# Patient Record
Sex: FEMALE | Race: WHITE | NOT HISPANIC OR LATINO | Employment: OTHER | URBAN - METROPOLITAN AREA
[De-identification: names, ages, dates, MRNs, and addresses within clinical notes are randomized per-mention and may not be internally consistent; named-entity substitution may affect disease eponyms.]

---

## 2018-02-06 ENCOUNTER — OFFICE VISIT CONVERTED (OUTPATIENT)
Dept: NEUROSURGERY | Facility: CLINIC | Age: 73
End: 2018-02-06
Attending: NEUROLOGICAL SURGERY

## 2018-02-27 ENCOUNTER — CONVERSION ENCOUNTER (OUTPATIENT)
Dept: CARDIOLOGY | Facility: CLINIC | Age: 73
End: 2018-02-27
Attending: SPECIALIST

## 2018-03-20 ENCOUNTER — OFFICE VISIT CONVERTED (OUTPATIENT)
Dept: NEUROSURGERY | Facility: CLINIC | Age: 73
End: 2018-03-20
Attending: NEUROLOGICAL SURGERY

## 2018-06-21 ENCOUNTER — OFFICE VISIT CONVERTED (OUTPATIENT)
Dept: NEUROSURGERY | Facility: CLINIC | Age: 73
End: 2018-06-21
Attending: NEUROLOGICAL SURGERY

## 2018-07-03 ENCOUNTER — OFFICE VISIT CONVERTED (OUTPATIENT)
Dept: CARDIOLOGY | Facility: CLINIC | Age: 73
End: 2018-07-03
Attending: SPECIALIST

## 2018-07-03 ENCOUNTER — CONVERSION ENCOUNTER (OUTPATIENT)
Dept: CARDIOLOGY | Facility: CLINIC | Age: 73
End: 2018-07-03

## 2018-11-06 ENCOUNTER — CONVERSION ENCOUNTER (OUTPATIENT)
Dept: CARDIOLOGY | Facility: CLINIC | Age: 73
End: 2018-11-06
Attending: SPECIALIST

## 2018-11-06 ENCOUNTER — CONVERSION ENCOUNTER (OUTPATIENT)
Dept: OTHER | Facility: HOSPITAL | Age: 73
End: 2018-11-06

## 2019-03-05 ENCOUNTER — OFFICE VISIT CONVERTED (OUTPATIENT)
Dept: CARDIOLOGY | Facility: CLINIC | Age: 74
End: 2019-03-05
Attending: SPECIALIST

## 2019-03-14 ENCOUNTER — HOSPITAL ENCOUNTER (OUTPATIENT)
Dept: GENERAL RADIOLOGY | Facility: HOSPITAL | Age: 74
Discharge: HOME | End: 2019-03-14
Attending: FAMILY MEDICINE

## 2019-06-10 ENCOUNTER — OFFICE VISIT CONVERTED (OUTPATIENT)
Dept: UROLOGY | Facility: CLINIC | Age: 74
End: 2019-06-10
Attending: UROLOGY

## 2019-06-10 ENCOUNTER — HOSPITAL ENCOUNTER (OUTPATIENT)
Dept: GENERAL RADIOLOGY | Facility: HOSPITAL | Age: 74
Discharge: HOME | End: 2019-06-10
Attending: UROLOGY

## 2019-06-10 ENCOUNTER — CONVERSION ENCOUNTER (OUTPATIENT)
Dept: SURGERY | Facility: CLINIC | Age: 74
End: 2019-06-10

## 2019-06-25 ENCOUNTER — HOSPITAL ENCOUNTER (OUTPATIENT)
Dept: GENERAL RADIOLOGY | Facility: HOSPITAL | Age: 74
Discharge: HOME | End: 2019-06-25
Attending: FAMILY MEDICINE

## 2019-06-25 ENCOUNTER — OFFICE VISIT CONVERTED (OUTPATIENT)
Dept: CARDIOLOGY | Facility: CLINIC | Age: 74
End: 2019-06-25
Attending: SPECIALIST

## 2019-06-25 LAB
CREAT BLD-MCNC: 1 MG/DL (ref 0.6–1.4)
GFR SERPLBLD BASED ON 1.73 SQ M-ARVRAT: 55 ML/MIN/{1.73_M2}

## 2019-09-10 ENCOUNTER — OFFICE VISIT CONVERTED (OUTPATIENT)
Dept: CARDIOLOGY | Facility: CLINIC | Age: 74
End: 2019-09-10
Attending: SPECIALIST

## 2020-02-28 ENCOUNTER — OFFICE VISIT CONVERTED (OUTPATIENT)
Dept: CARDIOLOGY | Facility: CLINIC | Age: 75
End: 2020-02-28
Attending: SPECIALIST

## 2020-02-28 ENCOUNTER — CONVERSION ENCOUNTER (OUTPATIENT)
Dept: CARDIOLOGY | Facility: CLINIC | Age: 75
End: 2020-02-28

## 2020-03-17 ENCOUNTER — OFFICE VISIT CONVERTED (OUTPATIENT)
Dept: NEUROSURGERY | Facility: CLINIC | Age: 75
End: 2020-03-17
Attending: NEUROLOGICAL SURGERY

## 2020-03-19 ENCOUNTER — HOSPITAL ENCOUNTER (OUTPATIENT)
Dept: GENERAL RADIOLOGY | Facility: HOSPITAL | Age: 75
Discharge: HOME | End: 2020-03-19
Attending: NEUROLOGICAL SURGERY

## 2020-03-26 ENCOUNTER — TELEMEDICINE CONVERTED (OUTPATIENT)
Dept: NEUROSURGERY | Facility: CLINIC | Age: 75
End: 2020-03-26
Attending: NEUROLOGICAL SURGERY

## 2020-07-08 ENCOUNTER — HOSPITAL ENCOUNTER (OUTPATIENT)
Dept: WOUND CARE | Facility: HOSPITAL | Age: 75
Setting detail: RECURRING SERIES
Discharge: HOME | End: 2020-10-06
Attending: FAMILY MEDICINE

## 2020-09-18 ENCOUNTER — OFFICE VISIT CONVERTED (OUTPATIENT)
Dept: CARDIOLOGY | Facility: CLINIC | Age: 75
End: 2020-09-18
Attending: SPECIALIST

## 2020-09-18 ENCOUNTER — CONVERSION ENCOUNTER (OUTPATIENT)
Dept: CARDIOLOGY | Facility: CLINIC | Age: 75
End: 2020-09-18

## 2021-01-01 ENCOUNTER — OFFICE VISIT (OUTPATIENT)
Dept: ORTHOPEDIC SURGERY | Facility: CLINIC | Age: 76
End: 2021-01-01

## 2021-01-01 ENCOUNTER — HOSPITAL ENCOUNTER (OUTPATIENT)
Dept: CT IMAGING | Facility: HOSPITAL | Age: 76
Discharge: HOME | End: 2021-10-01
Admitting: FAMILY MEDICINE

## 2021-01-01 ENCOUNTER — TRANSCRIBE ORDERS (OUTPATIENT)
Dept: ADMINISTRATIVE | Facility: HOSPITAL | Age: 76
End: 2021-01-01

## 2021-01-01 ENCOUNTER — OFFICE VISIT (OUTPATIENT)
Dept: CARDIOLOGY | Facility: CLINIC | Age: 76
End: 2021-01-01

## 2021-01-01 VITALS
HEIGHT: 59 IN | BODY MASS INDEX: 34.07 KG/M2 | WEIGHT: 169 LBS | DIASTOLIC BLOOD PRESSURE: 86 MMHG | SYSTOLIC BLOOD PRESSURE: 146 MMHG | HEART RATE: 104 BPM

## 2021-01-01 VITALS — OXYGEN SATURATION: 92 % | HEART RATE: 84 BPM | HEIGHT: 59 IN | BODY MASS INDEX: 31.93 KG/M2 | WEIGHT: 158.4 LBS

## 2021-01-01 DIAGNOSIS — R10.84 GENERALIZED ABDOMINAL PAIN: ICD-10-CM

## 2021-01-01 DIAGNOSIS — M25.572 LEFT ANKLE PAIN, UNSPECIFIED CHRONICITY: ICD-10-CM

## 2021-01-01 DIAGNOSIS — I25.10 CORONARY ARTERY DISEASE INVOLVING NATIVE CORONARY ARTERY OF NATIVE HEART WITHOUT ANGINA PECTORIS: Primary | ICD-10-CM

## 2021-01-01 DIAGNOSIS — I50.32 DIASTOLIC CHF, CHRONIC (HCC): ICD-10-CM

## 2021-01-01 DIAGNOSIS — S82.892D CLOSED FRACTURE OF LEFT ANKLE WITH ROUTINE HEALING, SUBSEQUENT ENCOUNTER: Primary | ICD-10-CM

## 2021-01-01 DIAGNOSIS — I10 HYPERTENSION, ESSENTIAL: ICD-10-CM

## 2021-01-01 DIAGNOSIS — R10.84 GENERALIZED ABDOMINAL PAIN: Primary | ICD-10-CM

## 2021-01-01 DIAGNOSIS — Z95.1 HX OF CABG: ICD-10-CM

## 2021-01-01 LAB — CREAT BLDA-MCNC: 0.8 MG/DL

## 2021-01-01 PROCEDURE — 99214 OFFICE O/P EST MOD 30 MIN: CPT | Performed by: SPECIALIST

## 2021-01-01 PROCEDURE — 74177 CT ABD & PELVIS W/CONTRAST: CPT

## 2021-01-01 PROCEDURE — 82565 ASSAY OF CREATININE: CPT

## 2021-01-01 PROCEDURE — 99212 OFFICE O/P EST SF 10 MIN: CPT | Performed by: STUDENT IN AN ORGANIZED HEALTH CARE EDUCATION/TRAINING PROGRAM

## 2021-01-01 PROCEDURE — 0 IOPAMIDOL PER 1 ML: Performed by: FAMILY MEDICINE

## 2021-01-01 PROCEDURE — 93000 ELECTROCARDIOGRAM COMPLETE: CPT | Performed by: SPECIALIST

## 2021-01-01 RX ORDER — FUROSEMIDE 20 MG/1
TABLET ORAL
Qty: 45 TABLET | Refills: 2 | Status: SHIPPED | OUTPATIENT
Start: 2021-01-01

## 2021-01-01 RX ORDER — LISINOPRIL 10 MG/1
10 TABLET ORAL DAILY
Qty: 90 TABLET | Refills: 3 | Status: SHIPPED | OUTPATIENT
Start: 2021-01-01 | End: 2021-01-01 | Stop reason: SDUPTHER

## 2021-01-01 RX ORDER — LISINOPRIL 20 MG/1
20 TABLET ORAL DAILY
Qty: 90 TABLET | Refills: 3 | Status: SHIPPED | OUTPATIENT
Start: 2021-01-01

## 2021-01-01 RX ORDER — LISINOPRIL 10 MG/1
TABLET ORAL
Qty: 30 TABLET | Refills: 2 | Status: SHIPPED | OUTPATIENT
Start: 2021-01-01 | End: 2021-01-01 | Stop reason: SDUPTHER

## 2021-01-01 RX ORDER — CLOPIDOGREL BISULFATE 75 MG/1
75 TABLET ORAL DAILY
Qty: 90 TABLET | Refills: 3 | Status: SHIPPED | OUTPATIENT
Start: 2021-01-01

## 2021-01-01 RX ORDER — CARVEDILOL 12.5 MG/1
TABLET ORAL
Qty: 180 TABLET | Refills: 3 | Status: SHIPPED | OUTPATIENT
Start: 2021-01-01

## 2021-01-01 RX ORDER — CLOPIDOGREL BISULFATE 75 MG/1
TABLET ORAL
Qty: 30 TABLET | Refills: 2 | Status: SHIPPED | OUTPATIENT
Start: 2021-01-01 | End: 2021-01-01 | Stop reason: SDUPTHER

## 2021-01-01 RX ADMIN — IOPAMIDOL 100 ML: 755 INJECTION, SOLUTION INTRAVENOUS at 13:07

## 2021-02-24 ENCOUNTER — OFFICE VISIT CONVERTED (OUTPATIENT)
Dept: ORTHOPEDIC SURGERY | Facility: CLINIC | Age: 76
End: 2021-02-24
Attending: STUDENT IN AN ORGANIZED HEALTH CARE EDUCATION/TRAINING PROGRAM

## 2021-03-03 ENCOUNTER — OFFICE VISIT CONVERTED (OUTPATIENT)
Dept: ORTHOPEDIC SURGERY | Facility: CLINIC | Age: 76
End: 2021-03-03
Attending: STUDENT IN AN ORGANIZED HEALTH CARE EDUCATION/TRAINING PROGRAM

## 2021-03-15 ENCOUNTER — OFFICE VISIT CONVERTED (OUTPATIENT)
Dept: ORTHOPEDIC SURGERY | Facility: CLINIC | Age: 76
End: 2021-03-15
Attending: STUDENT IN AN ORGANIZED HEALTH CARE EDUCATION/TRAINING PROGRAM

## 2021-03-29 ENCOUNTER — OFFICE VISIT CONVERTED (OUTPATIENT)
Dept: ORTHOPEDIC SURGERY | Facility: CLINIC | Age: 76
End: 2021-03-29
Attending: STUDENT IN AN ORGANIZED HEALTH CARE EDUCATION/TRAINING PROGRAM

## 2021-04-12 ENCOUNTER — OFFICE VISIT CONVERTED (OUTPATIENT)
Dept: ORTHOPEDIC SURGERY | Facility: CLINIC | Age: 76
End: 2021-04-12
Attending: STUDENT IN AN ORGANIZED HEALTH CARE EDUCATION/TRAINING PROGRAM

## 2021-05-10 NOTE — H&P
History and Physical      Patient Name: Rochelle Serrano   Patient ID: [id number]   Sex: Female   YOB: 1945    Primary Care Provider: Latrell Miguel MD   Referring Provider: Latosha Smith Jr. MD    Visit Date: February 24, 2021    Provider: Blake Acuña MD   Location: [facility]   Location Address: [address] [place] [address]  [id number]   Location Phone: [phone]          Chief Complaint  · left ankle pain      History Of Present Illness  Rochelle Serrano is a 75 year old /White female who presents today to [facility].      Rochelle presents to the office for evaluation of her left ankle. She slipped on ice and fell on the date of 2/22/2021. She felt immediate pain and was unable to ambulate on her left lower extremity. She presented to [facility] ED where x-rays were obtained and revealed a nondisplaced oblique fracture of the distal fibular metaphysis. She was placed in a fracture boot and follows up today for further evaluation. She reports she sustained some bumps and bruising, however denies any other associated injuries. She has history of 3 heart attacks and 4 stents. She is a diabetic, not on insulin. She is on Plavix. She states she had a previous blood clot 4-5 years ago. She states that she had fractured her boot previously twice as well as a shoulder surgery with Dr. Flores in 2012. She mentions numbness of her great toe. She denies any other numbness or tingling.  She is a nonsmoker. She is retired.               Past Medical History  Arm paresthesia, left; Arthritis; Asthma; CAD (coronary artery disease); Carpal tunnel syndrome; Carpal tunnel syndrome of left wrist; Cataract; Cervical radiculopathy; Chest pain; Congestive heart failure; Diabetes; DJD (degenerative joint disease); DVT (deep venous thrombosis); Heart Attack; Hyperlipemia; Hypertension; Kidney stone; Left leg pain; Limb Swelling; Lumbago; MI (myocardial infarction);  "Osteoarthritis; Paresthesia; Peptic ulcer; Sciatic leg pain; Shortness of Breath         Past Surgical History  Appendectomy; Carpal tunnel release; Cataract surgery; Cervical Fusion; Colonoscopy with polypectomy and biopsy; Hysterectomy; Rotator Cuff repair; Thyroidectomy; Ureter stent placement; Ureteroscopic Stone Removal         Medication List  albuterol sulfate 2.5 mg/0.5 mL inhalation solution for nebulization; Coreg 12.5 mg oral tablet; furosemide 20 mg oral tablet; Plavix 75 mg oral tablet; potassium chloride 20 mEq oral tablet extended release; Qvar 40 mcg/actuation inhalation aerosol; tramadol 50 mg oral tablet; Ventolin HFA 90 mcg/actuation inhalation HFA aerosol inhaler; Zestril 5 mg oral tablet         Allergy List  Acetaminophen adverse reaction; All Mycins; Codiene; erythromycin; PENICILLINS; SULFA (SULFONAMIDES); Tylenol       Allergies Reconciled  Family Medical History  Heart Disease; Cancer, Unspecified; Heart Attack (MI); Diabetes         Social History  Alcohol Use (Never); Claustophobic (Unknown); lives with spouse; .; Recreational Drug Use (Never); Retired.; Tobacco (Never)         Review of Systems  · Constitutional  o Denies  o : fever, chills, weight loss  · Cardiovascular  o Denies  o : chest pain, shortness of breath  · Gastrointestinal  o Denies  o : liver disease, heartburn, nausea, blood in stools  · Genitourinary  o Denies  o : painful urination, blood in urine  · Integument  o Denies  o : rash, itching  · Neurologic  o Denies  o : headache, weakness, loss of consciousness  · Musculoskeletal  o Admits  o : left ankle pain   · Psychiatric  o Denies  o : drug/alcohol addiction, anxiety, depression      Vitals  Date Time BP Position Site L\R Cuff Size HR RR TEMP (F) WT  HT  BMI kg/m2 BSA m2 O2 Sat FR L/min FiO2        02/24/2021 01:36 PM      94 - R   146lbs 0oz 4'  11\" 29.49 1.66 97 %            Physical Examination  · Constitutional  o Appearance  o : well developed, " well-nourished, no obvious deformities present  · Head and Face  o Head  o :   § Inspection  § : normocephalic  o Face  o :   § Inspection  § : no facial lesions  · Eyes  o Conjunctivae  o : conjunctivae normal  o Sclerae  o : sclerae white  · Ears, Nose, Mouth and Throat  o Ears  o :   § External Ears  § : appearance within normal limits  § Hearing  § : intact  o Nose  o :   § External Nose  § : appearance normal  · Neck  o Inspection/Palpation  o : normal appearance  o Range of Motion  o : full range of motion  · Respiratory  o Respiratory Effort  o : breathing unlabored  o Inspection of Chest  o : normal appearance  o Auscultation of Lungs  o : no audible wheezing or rales  · Cardiovascular  o Heart  o : regular rate  · Gastrointestinal  o Abdominal Examination  o : soft and non-tender  · Skin and Subcutaneous Tissue  o General Inspection  o : intact, no rashes  · Psychiatric  o General  o : Alert and oriented x3  o Judgement and Insight  o : judgment and insight intact  o Mood and Affect  o : mood normal, affect appropriate  · Left Ankle/Foot  o Inspection  o : Fracture boot removed. Moderate diffused swelling and bruising over the distal lower leg extending down the ankle. Tenderness over the medial malleolus. Tenderness over the lateral malleolus. Tenderness over the distal tibia shaft. No pain with syndesmotic squeeze. Nontender over the fibular head at the knee. Calf soft. Demonstrates active ankle dorsiflexion and plantarflexion. Sensations to light touch intact over the dorsal and planter foot. Palpable DP pulse.   · In Office Procedures  o View  o : stress/ AP, lateral   o Site  o : left ankle , tib/fib  o Indication  o : left ankle pain   o Study  o : X-rays ordered, taken in the office, and reviewed today.  o Xray  o : AP, oblique, and stress views of the left ankle obtained in the office today have been reviewed. A lateral malleolus fracture is visualized. This is oblique and at the level of the  syndesmosis. With stress there is interval increase in medial clear space and a likely medial malleolus avulsion fracture identified. No other acute fractures noted.  · Casting  o Extremity  o : left leg, Short leg cast.    o Procedure  o : Closed treatment was obtained and fiberglass cast was applied. The patient tolerated the procedure without any complications          Assessment  · Ankle fracture     824.8/S82.899A  A nondisplaced oblique fracture of the distal fibular metaphysis. Initial injury occurred 2/22/2021.  · Left ankle pain     719.47/M25.572      Plan  · Orders  o Casting Supplies (Short Leg) Adult () - 719.47/M25.572 - 02/24/2021   Applied by Moni Lagos MA  o Application of short leg cast (06939) - 719.47/M25.572 - 02/24/2021   Applied by Moni Lagos MA  o Ankle (Left) [facility] Preferred View ([id number]) - 719.47/M25.572 - 02/24/2021  o Tib/Fib (Left) [facility] Preferred View ([id number]) - 719.47/M25.572 - 02/24/2021  · Medications  o Medications have been Reconciled  o Transition of Care or Provider Policy  · Instructions  o X-rays reviewed by Dr. Acuña.  o Reviewed the patient's Past Medical, Social, and Family history as well as the ROS at today's visit, no changes.  o Call or return if worsening symptoms.  o Follow up in 1 week.  o The above service was scribed by Andre Guevara on my behalf and I attest to the accuracy of the note. cb  o Nafisa has a left ankle fracture. This appears to be a bimalleolar fracture with a lateral malleolus fracture at the level of the syndesmosis and a medial malleolus avulsion fracture. There is some instability noted on stress view today. Static view show a well aligned mortise. Nafisa elected for nonoperative management. She would like to avoid any surgeries. We will place her in a well-padded short leg cast. She should remain nonweightbearing. I will see her back in 1 week with new AP, oblique, and lateral x-rays of the left ankle and her cast. She will  continue on Plavix for her cardiac history. A prescription for Ultram was provided today for pain. Cast care was discussed.            Electronically Signed by: Blake Acuña MD -Author on February 28, 2021 01:35:08 PM

## 2021-05-12 ENCOUNTER — OFFICE VISIT CONVERTED (OUTPATIENT)
Dept: ORTHOPEDIC SURGERY | Facility: CLINIC | Age: 76
End: 2021-05-12
Attending: STUDENT IN AN ORGANIZED HEALTH CARE EDUCATION/TRAINING PROGRAM

## 2021-05-13 NOTE — PROGRESS NOTES
"   Progress Note      Patient Name: Rochelle Serrano   Patient ID: [id number]   Sex: Female   YOB: 1945    Primary Care Provider: Latrell Miguel MD   Referring Provider: Latosha Smith Jr. MD    Visit Date: September 18, 2020    Provider: Robin Bauer MD   Location: [facility]   Location Address: [address] [place] [address], [address]   [place]  [id number]   Location Phone: [phone]          Chief Complaint  · Coronary artery disease   · Pericardial effusion       History Of Present Illness  Rochelle Serrano is a 75 year old /White female with COPD, coronary artery disease, s/p CABG surgery, pericardial effusion, hypertension. Shortness of breath is stable. She is still refusing pericardial window for her posterior pericardial effusion.   CURRENT MEDICATIONS: include Zestril 10 mg daily; Plavix 75 mg daily; Lasix 20 mg 1-1/2 tablets daily; Coreg 12.5 mg b.i.d; Potassium 20 mEq daily. The dosage and frequency of the medications were reviewed with the patient.   PAST MEDICAL HISTORY: Chronic diastolic heart failure; coronary artery disease, s/p coronary artery bypass graft; essential hypertension; pericardial effusion.   FAMILY HISTORY: Positive for hypertension and heart disease. Negative for diabetes.   PSYCHOSOCIAL HISTORY: No history of mood changes or depression. She never used alcohol.       Review of Systems  · Cardiovascular  o Admits  o : swelling (feet, ankles, hands), shortness of breath while walking or lying flat  o Denies  o : palpitations (fast, fluttering, or skipping beats), chest pain or angina pectoris   · Respiratory  o Admits  o : chronic or frequent cough, asthma or wheezing      Vitals  Date Time BP Position Site L\R Cuff Size HR RR TEMP (F) WT  HT  BMI kg/m2 BSA m2 O2 Sat        09/18/2020 10:11 /74 Sitting    88 - R   156lbs 16oz 4'  11\" 31.71 1.72           Physical Examination  · Constitutional  o Appearance  o : Awake, alert, cooperative, " pleasant.  · Respiratory  o Inspection of Chest  o : No chest wall deformities, moving equal.  o Auscultation of Lungs  o : Good air entry with vesicular breath sounds.  · Cardiovascular  o Heart  o :   § Auscultation of Heart  § : S1 and S2 regular. No S3. No S4. No murmurs.  o Peripheral Vascular System  o :   § Extremities  § : Peripheral pulses were well felt. No edema. No cyanosis.  · Gastrointestinal  o Abdominal Examination  o : No masses or organomegaly noted.          Assessment     ASSESSMENT AND PLAN:    1.  Coronary artery disease, s/p CABG surgery, stable:  Continue current dose of Plavix.  2.  Chronic diastolic heart failure, stable:  Continue Lasix.  3.  Essential hypertension controlled:  Continue current dose of Zestril.  4.  Hyperlipidemia:  Not willing to start Lipitor since she had muscle pains with it.  5.  Moderately severe pericardial effusion, asymptomatic:  I discussed with her about pericardial window again.       She is still refusing.  Will think about it.  6.  See me back in 6 months.    Robin Bauer MD, [facility]  EMMANUEL/indu           This note was transcribed by Chelsea Mckeon.  indu/EMMANUEL  The above service was transcribed by Chelsea Mckeon, and I attest to the accuracy of the note.  EMMANUEL               Electronically Signed by: Chelsea Mckeon-, -Author on September 23, 2020 09:28:56 AM  Electronically Co-signed by: oRbin Bauer MD -Reviewer on September 23, 2020 10:32:01 AM

## 2021-05-14 VITALS — WEIGHT: 146 LBS | OXYGEN SATURATION: 97 % | HEART RATE: 94 BPM | HEIGHT: 59 IN | BODY MASS INDEX: 29.43 KG/M2

## 2021-05-14 VITALS
WEIGHT: 157 LBS | SYSTOLIC BLOOD PRESSURE: 120 MMHG | HEIGHT: 59 IN | DIASTOLIC BLOOD PRESSURE: 74 MMHG | BODY MASS INDEX: 31.65 KG/M2 | HEART RATE: 88 BPM

## 2021-05-14 VITALS — HEART RATE: 72 BPM | HEIGHT: 59 IN | TEMPERATURE: 96.6 F | OXYGEN SATURATION: 94 %

## 2021-05-14 VITALS — HEART RATE: 101 BPM | HEIGHT: 59 IN | OXYGEN SATURATION: 96 %

## 2021-05-14 VITALS — WEIGHT: 154.31 LBS | HEIGHT: 59 IN | HEART RATE: 90 BPM | OXYGEN SATURATION: 95 % | BODY MASS INDEX: 31.11 KG/M2

## 2021-05-14 VITALS — OXYGEN SATURATION: 96 % | HEART RATE: 54 BPM | TEMPERATURE: 97 F | HEIGHT: 59 IN

## 2021-05-14 NOTE — PROGRESS NOTES
Progress Note      Patient Name: Rochelle Serrano   Patient ID: [id number]   Sex: Female   YOB: 1945    Primary Care Provider: Latrell Miguel MD   Referring Provider: Latosha Smith Jr. MD    Visit Date: March 29, 2021    Provider: Blake Acuña MD   Location: [facility]   Location Address: [address] [place] [address]  [id number]   Location Phone: [phone]          Chief Complaint  · Followup left ankle fracture.      History Of Present Illness  Rochelle Serrano is a 76 year old /White female who returns for followup of her left ankle. She has a bimalleolar fracture with a lateral malleolus fracture at the level of the syndesmosis and a medial malleolus avulsion fracture. Her injury was on 02/22/2021. She is 6 weeks out from her injury now. She was treated in a short-leg cast for 4 weeks, followed by the last 2 weeks in a fracture boot. She has remained non-weightbearing. She denies any new injury since her previous visit. She reports that the boot is much more comfortable than the cast. She feels that her pain is improving overall. She is anxious to begin weightbearing.       Past Medical History  Arm paresthesia, left; Arthritis; Asthma; CAD (coronary artery disease); Carpal tunnel syndrome; Carpal tunnel syndrome of left wrist; Cataract; Cervical radiculopathy; Chest pain; Congestive heart failure; Diabetes; DJD (degenerative joint disease); DVT (deep venous thrombosis); Heart Attack; Hyperlipemia; Hypertension; Kidney Stone; Left leg pain; Limb Swelling; Lumbago; MI (myocardial infarction); Osteoarthritis; Paresthesia; Peptic ulcer; Sciatic leg pain; Shortness of Breath         Past Surgical History  Appendectomy; Carpal tunnel release; Cataract surgery; Cervical Fusion; Colonoscopy with polypectomy and biopsy; Hysterectomy; Rotator Cuff repair; Thyroidectomy; Ureter stent placement; Ureteroscopic Stone Removal         Medication List  albuterol sulfate 2.5 mg/0.5  "mL inhalation solution for nebulization; Coreg 12.5 mg oral tablet; furosemide 20 mg oral tablet; Plavix 75 mg oral tablet; potassium chloride 20 mEq oral tablet extended release; Qvar 40 mcg/actuation inhalation aerosol; tramadol 50 mg oral tablet; Ventolin HFA 90 mcg/actuation inhalation HFA aerosol inhaler; Zestril 5 mg oral tablet         Allergy List  Acetaminophen adverse reaction; All Mycins; Codiene; erythromycin; flu vaccine ts 2011-12(18 yr+); PENICILLINS; SULFA (SULFONAMIDES); Tylenol       Allergies Reconciled  Family Medical History  Heart Disease; Cancer, Unspecified; Heart Attack (MI); Diabetes         Social History  Alcohol Use (Never); Claustophobic (Unknown); lives with spouse; .; Recreational Drug Use (Never); Retired.; Tobacco (Never)         Review of Systems  · Constitutional  o Denies  o : fever, chills, weight loss  · Cardiovascular  o Denies  o : chest pain, shortness of breath  · Gastrointestinal  o Denies  o : liver disease, heartburn, nausea, blood in stools  · Genitourinary  o Denies  o : painful urination, blood in urine  · Integument  o Denies  o : rash, itching  · Neurologic  o Denies  o : headache, weakness, loss of consciousness  · Musculoskeletal  o Admits  o : painful, swollen joints  · Psychiatric  o Denies  o : drug/alcohol addiction, anxiety, depression      Vitals  Date Time BP Position Site L\R Cuff Size HR RR TEMP (F) WT  HT  BMI kg/m2 BSA m2 O2 Sat FR L/min FiO2        03/29/2021 09:07 AM      72 - R  96.6  4'  11\"   94 %  21%          Physical Examination  · Constitutional  o Appearance  o : well developed, well-nourished, no obvious deformities present  · Head and Face  o Head  o :   § Inspection  § : normocephalic  o Face  o :   § Inspection  § : no facial lesions  · Eyes  o Conjunctivae  o : conjunctivae normal  o Sclerae  o : sclerae white  · Ears, Nose, Mouth and Throat  o Ears  o :   § External Ears  § : appearance within normal limits  § Hearing  § : " intact  o Nose  o :   § External Nose  § : appearance normal  · Neck  o Inspection/Palpation  o : normal appearance  o Range of Motion  o : full range of motion  · Respiratory  o Respiratory Effort  o : breathing unlabored  o Inspection of Chest  o : normal appearance  o Auscultation of Lungs  o : no audible wheezing or rales  · Cardiovascular  o Heart  o : regular rate  · Gastrointestinal  o Abdominal Examination  o : soft and non-tender  · Skin and Subcutaneous Tissue  o General Inspection  o : intact, no rashes  · Psychiatric  o General  o : Alert and oriented x3  o Judgement and Insight  o : judgment and insight intact  o Mood and Affect  o : mood normal, affect appropriate  · Left Ankle/Foot  o Inspection  o : Fracture boot removed. Diffuse dry skin again noted. Mild persistent swelling. Tenderness over the lateral malleolus. Nontender over the medial malleolus. No pain with syndesmotic squeeze. Nontender about the hindfoot, midfoot, forefoot. Distal tenderness over the Achilles tendon, which is intact. Calf soft and nontender. No palpable cords are felt proximally. Sensation to light touch over the dorsal and plantar foot. Demonstrates active ankle plantar flexion and dorsiflexion, limited by stiffness. Palpable dorsalis pedis pulse.  · In Office Procedures  o View  o : AP/LATERAL/OBLIQUE  o Site  o : Left ankle  o Indication  o : Left ankle fracture  o Study  o : X-rays ordered, taken in the office, and reviewed today.  o Xray  o : The lateral malleolus fracture at the level of the syndesmosis is again seen. There are signs of bony healing with callus forming about the medial and lateral aspects of the fracture site. The medial malleolar avulsion-type fracture fragment is again seen and is unchanged in its appearance. The talus remains well reduced beneath the tibia. The syndesmosis is well aligned overall.   o Comparative Data  o : Comparative data found and reviewed today. For comparative data, this is  stable fracture alignment with signs of bone healing.           Assessment  · Left ankle fracture     824.8/S82.899A  · Left ankle pain, unspecified chronicity     719.47/M25.572      Plan  · Orders  o Ankle (Left) [facility] Preferred View ([id number]) - 719.47/M25.572 - 03/29/2021  · Medications  o Medications have been Reconciled  o Transition of Care or Provider Policy  · Instructions  o Reviewed the patient's Past Medical, Social, and Family history as well as the ROS at today's visit, no changes.  o Call or return if worsening symptoms.  o Note transcribed by Edilia Loaiza. cb   o Rochelle is now approximately 6 weeks out from her injury with a left bimalleolar ankle fracture that we are treating nonoperatively. Her ankle remains stable on X-rays obtained today, and she does have some signs of bone healing. We will progress her rehab at this time. She may begin to weight bear as tolerated in her fracture boot. She will start formal physical therapy to assist with her transition to weight bearing, as well as focus on range of motion and strengthening exercises. I will see her back in 2 weeks for reevaluation. We will obtain new AP, lateral, oblique views of the ankle at that time. She does have a walker that she will use as she starts physical therapy.             Electronically Signed by: Blake Acuña MD -Author on April 4, 2021 11:31:39 AM

## 2021-05-14 NOTE — PROGRESS NOTES
Progress Note      Patient Name: Rochelle Serrano   Patient ID: [id number]   Sex: Female   YOB: 1945    Primary Care Provider: Latrell Miguel MD   Referring Provider: Latosha Smith Jr. MD    Visit Date: March 3, 2021    Provider: Blake Acuña MD   Location: [facility]   Location Address: [address] [place] [address]  [id number]   Location Phone: [phone]          Chief Complaint  · Left ankle pain       History Of Present Illness  Rochelle Serrano is a 75 year old /White female who presents today to [facility].      Rochelle presents to the office for a follow up of her left ankle. To review, she has a bimalleolar fracture with a lateral malleolus fracture at the level of the syndesmosis and a medial malleolus avulsion fracture. She slipped on ice and fell on the date of 2/22/2021. She was placed in a fracture boot prior to her initial visit. We have been treating her in a well padded short leg cast for 2 weeks thus far. The cast is well-fitting. She is tolerating the cast well. She has intermittent sharp/radiating pains in the leg at times. She reports that she is experiencing a lot of pain over the fracture site. She denies any new falls or injuries.  She has been taking Ultram, tolerating it well. She is allergic to Tylenol. She has history of 3 heart attacks and 4 stents. She is a diabetic, not on insulin. She denies neuropathy. She is on Plavix. She states she had a previous blood clot 4-5 years ago. She mentions numbness of her great toe. She denies any other numbness or tingling.  She is a nonsmoker. She is retired.       Past Medical History  Arm paresthesia, left; Arthritis; Asthma; CAD (coronary artery disease); Carpal tunnel syndrome; Carpal tunnel syndrome of left wrist; Cataract; Cervical radiculopathy; Chest pain; Congestive heart failure; Diabetes; DJD (degenerative joint disease); DVT (deep venous thrombosis); Heart Attack; Hyperlipemia;  "Hypertension; Kidney stone; Left leg pain; Limb Swelling; Lumbago; MI (myocardial infarction); Osteoarthritis; Paresthesia; Peptic ulcer; Sciatic leg pain; Shortness of Breath         Past Surgical History  Appendectomy; Carpal tunnel release; Cataract surgery; Cervical Fusion; Colonoscopy with polypectomy and biopsy; Hysterectomy; Rotator Cuff repair; Thyroidectomy; Ureter stent placement; Ureteroscopic Stone Removal         Medication List  albuterol sulfate 2.5 mg/0.5 mL inhalation solution for nebulization; Coreg 12.5 mg oral tablet; furosemide 20 mg oral tablet; Plavix 75 mg oral tablet; potassium chloride 20 mEq oral tablet extended release; Qvar 40 mcg/actuation inhalation aerosol; tramadol 50 mg oral tablet; Ventolin HFA 90 mcg/actuation inhalation HFA aerosol inhaler; Zestril 5 mg oral tablet         Allergy List  Acetaminophen adverse reaction; All Mycins; Codiene; erythromycin; PENICILLINS; SULFA (SULFONAMIDES); Tylenol       Allergies Reconciled  Family Medical History  Heart Disease; Cancer, Unspecified; Heart Attack (MI); Diabetes         Social History  Alcohol Use (Never); Claustophobic (Unknown); lives with spouse; .; Recreational Drug Use (Never); Retired.; Tobacco (Never)         Review of Systems  · Constitutional  o Denies  o : fever, chills, weight loss  · Cardiovascular  o Denies  o : chest pain, shortness of breath  · Gastrointestinal  o Denies  o : liver disease, heartburn, nausea, blood in stools  · Genitourinary  o Denies  o : painful urination, blood in urine  · Integument  o Denies  o : rash, itching  · Neurologic  o Denies  o : headache, weakness, loss of consciousness  · Musculoskeletal  o Admits  o : left ankle pain   · Psychiatric  o Denies  o : drug/alcohol addiction, anxiety, depression      Vitals  Date Time BP Position Site L\R Cuff Size HR RR TEMP (F) WT  HT  BMI kg/m2 BSA m2 O2 Sat FR L/min FiO2 HC       03/03/2021 08:38 AM      101 - R    4'  11\"   96 %    "         Physical Examination  · Constitutional  o Appearance  o : well developed, well-nourished, no obvious deformities present  · Head and Face  o Head  o :   § Inspection  § : normocephalic  o Face  o :   § Inspection  § : no facial lesions  · Eyes  o Conjunctivae  o : conjunctivae normal  o Sclerae  o : sclerae white  · Ears, Nose, Mouth and Throat  o Ears  o :   § External Ears  § : appearance within normal limits  § Hearing  § : intact  o Nose  o :   § External Nose  § : appearance normal  · Neck  o Inspection/Palpation  o : normal appearance  o Range of Motion  o : full range of motion  · Respiratory  o Respiratory Effort  o : breathing unlabored  o Inspection of Chest  o : normal appearance  o Auscultation of Lungs  o : no audible wheezing or rales  · Cardiovascular  o Heart  o : regular rate  · Gastrointestinal  o Abdominal Examination  o : soft and non-tender  · Skin and Subcutaneous Tissue  o General Inspection  o : intact, no rashes  · Psychiatric  o General  o : Alert and oriented x3  o Judgement and Insight  o : judgment and insight intact  o Mood and Affect  o : mood normal, affect appropriate  · Left Ankle/Foot  o Inspection  o : Short-leg cast is clean dry and intact. No skin irritation or wounds around the cast edges. Sensation intact over exposed toes. Wiggles toes pain free. Less than 2 seconds capillary refill in the toes. Palpable DP pulse.  · In Office Procedures  o View  o : AP/LATERAL/OBLIQUE   o Site  o : Left ankle   o Indication  o : Left ankle pain   o Study  o : X-rays ordered, taken in the office, and reviewed today.  o Xray  o : AP, oblique, and lateral views of the left ankle obtained in the office today have been reviewed. The fracture of the lateral malleolus at the level of syndesmosis is again visualized. The fracture remains well aligned on both AP and lateral views. The talus appears well reduced beneath the tibia. The syndesmosis appears well aligned. No other acute Bony  abnormalities are noted. A short leg cast is in place.   o Comparative Data  o : Stable fracture alignment.           Assessment  · Ankle fracture     824.8/S82.899A  · Left ankle pain     719.47/M25.572      Plan  · Orders  o Ankle (Left) [facility] Preferred [facility] ([id number]) - 719.47/M25.572 - 03/03/2021  · Instructions  o X-rays reviewed by Dr. Acuña.  o Reviewed the patient's Past Medical, Social, and Family history as well as the ROS at today's visit, no changes.  o Call or return if worsening symptoms.  o Follow Up in 2 weeks.  o The above service was scribed by Andre Guevara on my behalf and I attest to the accuracy of the note. cb  o We will continue with non-operative management of Lia left ankle fracture. She did have some instability noted on her previously stress radiographs. Her talus and syndesmosis remain well aligned on her x-rays obtained today. No signs of bone healing yet. No cast complications at this time. She will remain non-weight-bearing on the left lower extremity in her short leg cast. Cast care instructions were again reviewed. I will see her back in 2 weeks with new AP, oblique, lateral views of the left ankle in the current short leg cast. We discussed pain medications. A prescription filled for Ultram was provided previously and she's doing well with that. She may call with one additional refill if needed.             Electronically Signed by: Blake Acuña MD -Author on March 6, 2021 04:47:46 PM

## 2021-05-14 NOTE — PROGRESS NOTES
Progress Note      Patient Name: Rochelle Serrano   Patient ID: [id number]   Sex: Female   YOB: 1945    Primary Care Provider: Latrell Miguel MD   Referring Provider: Latosha Smith Jr. MD    Visit Date: March 15, 2021    Provider: Blake Acuña MD   Location: [facility]   Location Address: [address] [place] [address]  [id number]   Location Phone: [phone]          Chief Complaint  · left ankle pain      History Of Present Illness  Rochelle Serrano is a 76 year old /White female who presents today to [facility].      Rochelle presents to the office for a follow up of her left ankle. To review, she has a bimalleolar fracture with a lateral malleolus fracture at the level of the syndesmosis and a medial malleolus avulsion fracture. She slipped on ice and fell on the date of 2/22/2021. She was placed in a fracture boot prior to her initial visit. We have been treating her in a well padded short leg cast for 4 weeks. The cast is well-fitting. She has not been tolerating the cast very well and she is concerned about it rubbing against her leg. She states there is still pain about the fracture site. She denies any new falls or injuries. She has been utilizing a walker as an ambulatory device at home. She has been taking Ultram, tolerating it well. She is allergic to Tylenol. She has history of 3 heart attacks and 4 stents. She is a diabetic, not on insulin. She denies neuropathy. She is on Plavix. She states she had a previous blood clot 4-5 years ago. She mentions numbness of her great toe. She denies any other numbness or tingling. She is a nonsmoker. She is retired.         Past Medical History  Arm paresthesia, left; Arthritis; Asthma; CAD (coronary artery disease); Carpal tunnel syndrome; Carpal tunnel syndrome of left wrist; Cataract; Cervical radiculopathy; Chest pain; Congestive heart failure; Diabetes; DJD (degenerative joint disease); DVT (deep venous  thrombosis); Heart Attack; Hyperlipemia; Hypertension; Kidney stone; Left leg pain; Limb Swelling; Lumbago; MI (myocardial infarction); Osteoarthritis; Paresthesia; Peptic ulcer; Sciatic leg pain; Shortness of Breath         Past Surgical History  Appendectomy; Carpal tunnel release; Cataract surgery; Cervical Fusion; Colonoscopy with polypectomy and biopsy; Hysterectomy; Rotator Cuff repair; Thyroidectomy; Ureter stent placement; Ureteroscopic Stone Removal         Medication List  albuterol sulfate 2.5 mg/0.5 mL inhalation solution for nebulization; Coreg 12.5 mg oral tablet; furosemide 20 mg oral tablet; Plavix 75 mg oral tablet; potassium chloride 20 mEq oral tablet extended release; Qvar 40 mcg/actuation inhalation aerosol; tramadol 50 mg oral tablet; Ventolin HFA 90 mcg/actuation inhalation HFA aerosol inhaler; Zestril 5 mg oral tablet         Allergy List  Acetaminophen adverse reaction; All Mycins; Codiene; erythromycin; flu vaccine ts 2011-12(18 yr+); PENICILLINS; SULFA (SULFONAMIDES); Tylenol       Allergies Reconciled  Family Medical History  Heart Disease; Cancer, Unspecified; Heart Attack (MI); Diabetes         Social History  Alcohol Use (Never); Claustophobic (Unknown); lives with spouse; .; Recreational Drug Use (Never); Retired.; Tobacco (Never)         Review of Systems  · Constitutional  o Denies  o : fever, chills, weight loss  · Cardiovascular  o Denies  o : chest pain, shortness of breath  · Gastrointestinal  o Denies  o : liver disease, heartburn, nausea, blood in stools  · Genitourinary  o Denies  o : painful urination, blood in urine  · Integument  o Denies  o : rash, itching  · Neurologic  o Denies  o : headache, weakness, loss of consciousness  · Musculoskeletal  o Admits  o : left ankle pain  · Psychiatric  o Denies  o : drug/alcohol addiction, anxiety, depression      Vitals  Date Time BP Position Site L\R Cuff Size HR RR TEMP (F) WT  HT  BMI kg/m2 BSA m2 O2 Sat FR L/min FiO2 HC   "     03/15/2021 08:37 AM      54 - R  97  4'  11\"   96 %  21%          Physical Examination  · Constitutional  o Appearance  o : well developed, well-nourished, no obvious deformities present  · Head and Face  o Head  o :   § Inspection  § : normocephalic  o Face  o :   § Inspection  § : no facial lesions  · Eyes  o Conjunctivae  o : conjunctivae normal  o Sclerae  o : sclerae white  · Ears, Nose, Mouth and Throat  o Ears  o :   § External Ears  § : appearance within normal limits  § Hearing  § : intact  o Nose  o :   § External Nose  § : appearance normal  · Neck  o Inspection/Palpation  o : normal appearance  o Range of Motion  o : full range of motion  · Respiratory  o Respiratory Effort  o : breathing unlabored  o Inspection of Chest  o : normal appearance  o Auscultation of Lungs  o : no audible wheezing or rales  · Cardiovascular  o Heart  o : regular rate  · Gastrointestinal  o Abdominal Examination  o : soft and non-tender  · Skin and Subcutaneous Tissue  o General Inspection  o : intact, no rashes  · Psychiatric  o General  o : Alert and oriented x3  o Judgement and Insight  o : judgment and insight intact  o Mood and Affect  o : mood normal, affect appropriate  · Left Ankle/Foot  o Inspection  o : Short leg cast removed, dry skin otherwise no skin breakdown, no wounds secondary from the cast. Tenderness over the fracture site. Calf soft. Demonstrates gentle active dorsiflexion and plantarflexion. Sensations to light touch intact over the dorsal and planter foot. Palpable DP pulse. Wiggles toes pain free. Well perfused toes.   · In Office Procedures  o View  o : AP/LATERAL/OBLIQUE   o Site  o : LEFT ANKLE   o Indication  o : LEFT ANKLE PAIN  o Study  o : X-rays ordered, taken in the office, and reviewed today.  o Xray  o : AP, oblique, and Lateral views obtained in the short leg cast have been reviewed today. The fracture of the lateral malleolus at the level of the syndesmosis is again visualized. Fracture " alignment is unchanged compared to previous x-rays. No obvious bone healing noted in these films. The talus remains will reduce beneath the tibia. The syndesmosis is well aligned. No other acute bony abnormalities are noted.   o Comparative Data  o : Stable fracture alignment          Assessment  · Left Ankle fracture     824.8/S82.899A  A nondisplaced oblique fracture of the distal fibular metaphysis. Initial injury occurred 2/22/2021.   · Left ankle pain, unspecified chronicity     719.47/M25.572      Plan  · Orders  o Ankle (Left) [facility]erred [facility] ([id number]) - 719.47/M25.572 - 03/15/2021  · Medications  o Medications have been Reconciled  o Transition of Care or Provider Policy  · Instructions  o X-rays reviewed by Dr. Acuña.  o Reviewed the patient's Past Medical, Social, and Family history as well as the ROS at today's visit, no changes.  o Call or return if worsening symptoms.  o Follow Up in 2 weeks.  o The above service was scribed by Andre Guevara on my behalf and I attest to the accuracy of the note. cb  krista Byrd is now approximately 4 weeks out from her injury. She has a left bimalleolar ankle fracture that includes a lateral malleolus fracture at the level of the syndesmosis and a medial malleolus avulsion fracture. She is not tolerating the cast at this point. She's having leg pain located over the fracture site and she is concerned about the cast rubbing her skin. We removed the cast today and no wounds were noted. We will transition her to a fracture boot. She should remain non-weight-bearing on her left lower extremity in the boot. I will see her back in 2 weeks. We will obtain new AP, oblique, lateral views of the left ankle at that time. We will likely begin weight-bearing in the boot at that time. She was agreeable to the plan. We will also provide her with crutches as she has not been doing as well as she hoped with the walker.             Electronically Signed by: Andre Guevara MA  -Author on March 16, 2021 10:57:50 AM

## 2021-05-14 NOTE — PROGRESS NOTES
Progress Note      Patient Name: Rochelle Serrano   Patient ID: [id number]   Sex: Female   YOB: 1945    Primary Care Provider: Latrell Miguel MD   Referring Provider: Latosha Smith Jr. MD    Visit Date: April 12, 2021    Provider: Blake Acuña MD   Location: [facility]   Location Address: [address] [place] [address]  [id number]   Location Phone: [phone]          Chief Complaint  · left ankle pain      History Of Present Illness  Rochelle Serrano is a 76 year old /White female who presents today to [facility].      Rochelle returns for follow-up of her left ankle.  She has a bimalleolar left ankle fracture with a lateral malleolus fracture at the level of the syndesmosis and a medial malleolus avulsion fracture.  Her injury was on 02/21/2021. She is now 8 weeks out.  She was in a short leg cast initially, followed by fracture boot.  At her previous visit progressed her to weight bearing as tolerated in the fracture boot.  She reports she has done very well in the boot.  She is attending formal physical therapy and is happy with her progress.  She reports her pain is improving.  She has mild intermittent swelling, but states this is a chronic problem for her.  She denies any new falls or injuries.           Past Medical History  Arm paresthesia, left; Arthritis; Asthma; CAD (coronary artery disease); Carpal tunnel syndrome; Carpal tunnel syndrome of left wrist; Cataract; Cervical radiculopathy; Chest pain; Congestive heart failure; Diabetes; DJD (degenerative joint disease); DVT (deep venous thrombosis); Heart Attack; Hyperlipemia; Hypertension; Kidney stone; Left leg pain; Limb Swelling; Lumbago; MI (myocardial infarction); Osteoarthritis; Paresthesia; Peptic ulcer; Sciatic leg pain; Shortness of Breath         Past Surgical History  Appendectomy; Carpal tunnel release; Cataract surgery; Cervical Fusion; Colonoscopy with polypectomy and biopsy;  "Hysterectomy; Rotator Cuff repair; Thyroidectomy; Ureter stent placement; Ureteroscopic Stone Removal         Medication List  albuterol sulfate 2.5 mg/0.5 mL inhalation solution for nebulization; Coreg 12.5 mg oral tablet; furosemide 20 mg oral tablet; Plavix 75 mg oral tablet; potassium chloride 20 mEq oral tablet extended release; Qvar 40 mcg/actuation inhalation aerosol; tramadol 50 mg oral tablet; Ventolin HFA 90 mcg/actuation inhalation HFA aerosol inhaler; Zestril 5 mg oral tablet         Allergy List  Acetaminophen adverse reaction; All Mycins; Codiene; erythromycin; flu vaccine ts 2011-12(18 yr+); PENICILLINS; SULFA (SULFONAMIDES); Tylenol       Allergies Reconciled  Family Medical History  Heart Disease; Cancer, Unspecified; Heart Attack (MI); Diabetes         Social History  Alcohol Use (Never); Claustophobic (Unknown); lives with spouse; .; Recreational Drug Use (Never); Retired.; Tobacco (Never)         Review of Systems  · Constitutional  o Denies  o : fever, chills, weight loss  · Cardiovascular  o Denies  o : chest pain, shortness of breath  · Gastrointestinal  o Denies  o : liver disease, heartburn, nausea, blood in stools  · Genitourinary  o Denies  o : painful urination, blood in urine  · Integument  o Denies  o : rash, itching  · Neurologic  o Denies  o : headache, weakness, loss of consciousness  · Musculoskeletal  o Admits  o : ankle pain  · Psychiatric  o Denies  o : drug/alcohol addiction, anxiety, depression      Vitals  Date Time BP Position Site L\R Cuff Size HR RR TEMP (F) WT  HT  BMI kg/m2 BSA m2 O2 Sat FR L/min FiO2        04/12/2021 09:36 AM      90 - R   154lbs 5oz 4'  11\" 31.17 1.71 95 %            Physical Examination  · Constitutional  o Appearance  o : well developed, well-nourished, no obvious deformities present  · Head and Face  o Head  o :   § Inspection  § : normocephalic  o Face  o :   § Inspection  § : no facial lesions  · Eyes  o Conjunctivae  o : conjunctivae " normal  o Sclerae  o : sclerae white  · Ears, Nose, Mouth and Throat  o Ears  o :   § External Ears  § : appearance within normal limits  § Hearing  § : intact  o Nose  o :   § External Nose  § : appearance normal  · Neck  o Inspection/Palpation  o : normal appearance  o Range of Motion  o : full range of motion  · Respiratory  o Respiratory Effort  o : breathing unlabored  o Inspection of Chest  o : normal appearance  o Auscultation of Lungs  o : no audible wheezing or rales  · Cardiovascular  o Heart  o : regular rate  · Gastrointestinal  o Abdominal Examination  o : soft and non-tender  · Skin and Subcutaneous Tissue  o General Inspection  o : intact, no rashes  · Psychiatric  o General  o : Alert and oriented x3  o Judgement and Insight  o : judgment and insight intact  o Mood and Affect  o : mood normal, affect appropriate  · Left Ankle/Foot  o Inspection  o : Mild diffuse swelling about the ankle. Mild discomfort to palpation over the lateral malleolus and medial malleolus. No pain with syndesmotic squeeze. Nontender about the hindfoot, midfoot, forefoot. Demonstrates active ankle dorsiflexion in neutral and plantar flexion to 15 degrees. Sensation intact to light touch over the dorsal and plantar foot. Palpable dorsalis pedis pulse. No wounds are noted.   · In Office Procedures  o View  o : AP/LATERAL/OBLIQUE   o Site  o : left, ankle   o Indication  o : Left ankle pain   o Study  o : X-rays ordered, taken in the office, and reviewed today.  o Xray  o : AP, oblique, and lateral views of left ankle obtained in the office today and reviewed. The left lateral malleolus is again seen. There is callus forming about the fracture site. The medial malleolus avulsion fracture can be seen on the oblique views and is unchanged in its appearance. The talus remains reduced beneath the tibia. No additional acute bony abnormalities noted.  o Comparative Data  o : Comparative Data found and reviewed today            Assessment  · Left ankle fracture     824.8/S82.899A  · Left ankle pain     719.47/M25.572      Plan  · Orders  o Ankle (Left) [facility] Preferred View ([id number]) - 719.47/M25.572 - 04/12/2021  · Medications  o Medications have been Reconciled  o Transition of Care or Provider Policy  · Instructions  o Reviewed the patient's Past Medical, Social, and Family history as well as the ROS at today's visit, no changes.  o Call or return if worsening symptoms.  o This note is transcribed by Aliza Diehl /mary  o Rochelle is now approximately 8 weeks out from her left bimalleolar type ankle fracture that we are treating non-operatively. She may continue weight bearing as tolerated in the fracture boot. She will work with physical therapy. She will use crutches or a walker for assistance with ambulation. I want to see her back in 4 weeks for re-evaluation. We will obtain AP, oblique, and lateral x-rays of left ankle at that time. We will likely transition to brace wear and progress her rehab at that point.             Electronically Signed by: Blake Acuña MD -Author on April 16, 2021 01:08:12 PM

## 2021-05-15 VITALS
WEIGHT: 162 LBS | HEIGHT: 59 IN | BODY MASS INDEX: 32.66 KG/M2 | DIASTOLIC BLOOD PRESSURE: 54 MMHG | SYSTOLIC BLOOD PRESSURE: 106 MMHG | HEART RATE: 87 BPM

## 2021-05-15 VITALS
BODY MASS INDEX: 32.05 KG/M2 | DIASTOLIC BLOOD PRESSURE: 70 MMHG | SYSTOLIC BLOOD PRESSURE: 148 MMHG | HEART RATE: 88 BPM | WEIGHT: 159 LBS | HEIGHT: 59 IN

## 2021-05-15 VITALS
HEIGHT: 59 IN | BODY MASS INDEX: 34.07 KG/M2 | WEIGHT: 169 LBS | DIASTOLIC BLOOD PRESSURE: 76 MMHG | SYSTOLIC BLOOD PRESSURE: 120 MMHG | HEART RATE: 86 BPM

## 2021-05-15 VITALS
BODY MASS INDEX: 34.17 KG/M2 | SYSTOLIC BLOOD PRESSURE: 132 MMHG | RESPIRATION RATE: 24 BRPM | DIASTOLIC BLOOD PRESSURE: 72 MMHG | WEIGHT: 169.5 LBS | HEIGHT: 59 IN

## 2021-05-15 VITALS
BODY MASS INDEX: 31.55 KG/M2 | WEIGHT: 156.5 LBS | SYSTOLIC BLOOD PRESSURE: 137 MMHG | DIASTOLIC BLOOD PRESSURE: 68 MMHG | HEIGHT: 59 IN

## 2021-05-16 VITALS
HEIGHT: 59 IN | WEIGHT: 168 LBS | DIASTOLIC BLOOD PRESSURE: 61 MMHG | SYSTOLIC BLOOD PRESSURE: 145 MMHG | BODY MASS INDEX: 33.87 KG/M2

## 2021-05-16 VITALS
HEIGHT: 59 IN | BODY MASS INDEX: 34.27 KG/M2 | SYSTOLIC BLOOD PRESSURE: 168 MMHG | DIASTOLIC BLOOD PRESSURE: 82 MMHG | WEIGHT: 170 LBS | HEART RATE: 84 BPM

## 2021-05-16 VITALS
HEIGHT: 59 IN | SYSTOLIC BLOOD PRESSURE: 126 MMHG | BODY MASS INDEX: 33.87 KG/M2 | DIASTOLIC BLOOD PRESSURE: 63 MMHG | WEIGHT: 168 LBS

## 2021-05-16 VITALS
WEIGHT: 172 LBS | SYSTOLIC BLOOD PRESSURE: 116 MMHG | HEART RATE: 92 BPM | HEIGHT: 59 IN | BODY MASS INDEX: 34.68 KG/M2 | DIASTOLIC BLOOD PRESSURE: 82 MMHG

## 2021-05-16 VITALS
WEIGHT: 171 LBS | HEIGHT: 59 IN | DIASTOLIC BLOOD PRESSURE: 76 MMHG | BODY MASS INDEX: 34.47 KG/M2 | HEART RATE: 78 BPM | SYSTOLIC BLOOD PRESSURE: 116 MMHG

## 2021-05-16 VITALS
BODY MASS INDEX: 33.67 KG/M2 | HEART RATE: 120 BPM | WEIGHT: 167 LBS | SYSTOLIC BLOOD PRESSURE: 120 MMHG | DIASTOLIC BLOOD PRESSURE: 68 MMHG | HEIGHT: 59 IN

## 2021-05-16 VITALS
SYSTOLIC BLOOD PRESSURE: 126 MMHG | DIASTOLIC BLOOD PRESSURE: 59 MMHG | WEIGHT: 170 LBS | HEIGHT: 59 IN | BODY MASS INDEX: 34.27 KG/M2

## 2021-06-05 NOTE — PROGRESS NOTES
Progress Note      Patient Name: Rochelle Serrano   Patient ID: [id number]   Sex: Female   YOB: 1945    Primary Care Provider: Latrell Miguel MD   Referring Provider: Latosha Smith Jr. MD    Visit Date: May 12, 2021    Provider: Blake Acuña MD   Location: [facility]   Location Address: [address] [place] [address]  [id number]   Location Phone: [phone]          Chief Complaint  · Followup left ankle fracture.      History Of Present Illness  Rochelle Serrano is a 76 year old /White female who returns today for followup of her left ankle. She has a bimalleolar left ankle fracture that we are treating nonoperatively. He injury was on 02/21/2021. She is now approximately 12 weeks out from her injury. She has been weightbearing as tolerated in her fracture boot and working with physical therapy. She reports minimal pain at this time. She denies any new injuries. She is not using any medications specifically for the ankle. She is happy with her therapy progress. She denies any complications secondary to her fracture boot.       Past Medical History  Arm paresthesia, left; Arthritis; Asthma; CAD (coronary artery disease); Carpal tunnel syndrome; Carpal tunnel syndrome of left wrist; Cataract; Cervical radiculopathy; Chest pain; Congestive heart failure; Diabetes; DJD (degenerative joint disease); DVT (deep venous thrombosis); Heart Attack; Hyperlipemia; Hypertension; Kidney stone; Left leg pain; Limb Swelling; Lumbago; MI (myocardial infarction); Osteoarthritis; Paresthesia; Peptic ulcer; Sciatic leg pain; Shortness of Breath         Past Surgical History  Appendectomy; Carpal tunnel release; Cataract surgery; Cervical Fusion; Colonoscopy with polypectomy and biopsy; Hysterectomy; Rotator Cuff repair; Thyroidectomy; Ureter stent placement; Ureteroscopic Stone Removal         Medication List  albuterol sulfate 2.5 mg/0.5 mL inhalation solution for nebulization; Coreg 12.5 mg  "oral tablet; furosemide 20 mg oral tablet; Plavix 75 mg oral tablet; potassium chloride 20 mEq oral tablet extended release; Qvar 40 mcg/actuation inhalation aerosol; tramadol 50 mg oral tablet; Ventolin HFA 90 mcg/actuation inhalation HFA aerosol inhaler; Zestril 5 mg oral tablet         Allergy List  Acetaminophen adverse reaction; All Mycins; Codiene; erythromycin; flu vaccine ts 2011-12(18 yr+); PENICILLINS; SULFA (SULFONAMIDES); Tylenol       Allergies Reconciled  Family Medical History  Heart Disease; Cancer, Unspecified; Heart Attack (MI); Diabetes         Social History  Alcohol Use (Never); Claustophobic (Unknown); lives with spouse; .; Recreational Drug Use (Never); Retired.; Tobacco (Never)         Review of Systems  · Constitutional  o Denies  o : fever, chills, weight loss  · Cardiovascular  o Denies  o : chest pain, shortness of breath  · Gastrointestinal  o Denies  o : liver disease, heartburn, nausea, blood in stools  · Genitourinary  o Denies  o : painful urination, blood in urine  · Integument  o Denies  o : rash, itching  · Neurologic  o Denies  o : headache, weakness, loss of consciousness  · Musculoskeletal  o Admits  o : ankle pain  · Psychiatric  o Denies  o : drug/alcohol addiction, anxiety, depression      Vitals  Date Time BP Position Site L\R Cuff Size HR RR TEMP (F) WT  HT  BMI kg/m2 BSA m2 O2 Sat FR L/min FiO2        05/12/2021 09:33 AM         146lbs 0oz 4'  11\" 29.49 1.66             Physical Examination  · Constitutional  o Appearance  o : well developed, well-nourished, no obvious deformities present  · Head and Face  o Head  o :   § Inspection  § : normocephalic  o Face  o :   § Inspection  § : no facial lesions  · Eyes  o Conjunctivae  o : conjunctivae normal  o Sclerae  o : sclerae white  · Ears, Nose, Mouth and Throat  o Ears  o :   § External Ears  § : appearance within normal limits  § Hearing  § : intact  o Nose  o :   § External Nose  § : appearance " normal  · Neck  o Inspection/Palpation  o : normal appearance  o Range of Motion  o : full range of motion  · Respiratory  o Respiratory Effort  o : breathing unlabored  o Inspection of Chest  o : normal appearance  o Auscultation of Lungs  o : no audible wheezing or rales  · Cardiovascular  o Heart  o : regular rate  · Gastrointestinal  o Abdominal Examination  o : soft and non-tender  · Skin and Subcutaneous Tissue  o General Inspection  o : intact, no rashes  · Psychiatric  o General  o : Alert and oriented x3  o Judgement and Insight  o : judgment and insight intact  o Mood and Affect  o : mood normal, affect appropriate  · Left Ankle/Foot  o Inspection  o : Ambulates with a fracture boot and walker. Fracture boot removed. Mild diffuse swelling. Mild tenderness to palpation over the lateral malleolus and medial malleolus. No pain with syndesmotic squeeze. Ankle range of motion from 5 degrees of dorsiflexion to 20 degrees of plantar flexion. Sensation intact to light touch over the dorsal and plantar foot. Calf soft and nontender. Palpable dorsalis pedis pulse.   · In Office Procedures  o View  o : AP/LATERAL/OBLIQUE  o Site  o : Left ankle  o Indication  o : Left ankle fracture  o Study  o : X-rays ordered, taken in the office, and reviewed today.  o Xray  o : The bimalleolar-type fracture of the left ankle is again visualized. There appears to be bony bridging across the lateral malleolar fracture site. The medial malleolus avulsion fracture appears unchanged overall. The talus remains well reduced beneath the tibia. The syndesmosis is well aligned. No additional fractures are noted.   o Comparative Data  o : Comparative Data found and reviewed today           Assessment  · Left ankle fracture     824.8/S82.899A  · Left ankle pain, unspecified chronicity     719.47/M25.572      Plan  · Orders  o Ankle (Left) [facility] ([id number]) - 719.47/M25.572 - 05/12/2021  · Medications  o Medications have been  Reconciled  o Transition of Care or Provider Policy  · Instructions  o Reviewed the patient's Past Medical, Social, and Family history as well as the ROS at today's visit, no changes.  o Call or return if worsening symptoms.  o Note transcribed by Edilia Loaiza. cb   krista Byrd is now approximately 12 weeks out from a left bimalleolar ankle fracture that we are treating nonoperatively. We will continue to progress her rehab. Her radiographs are stable. She will transition from a fracture boot to an ankle brace with the assistance of Physical Therapy. They will continue to work on ankle range of motion and strengthening, as well as overall gait training. I will see her back in 1 month for reevaluation. We will obtain new AP, lateral, and oblique X-rays of the left ankle at that visit.             Electronically Signed by: Blake Acuña MD -Author on May 20, 2021 07:23:49 PM

## 2021-06-09 ENCOUNTER — HOSPITAL ENCOUNTER (OUTPATIENT)
Dept: GENERAL RADIOLOGY | Facility: HOSPITAL | Age: 76
Discharge: HOME | End: 2021-06-09
Admitting: FAMILY MEDICINE

## 2021-06-09 ENCOUNTER — TRANSCRIBE ORDERS (OUTPATIENT)
Dept: GENERAL RADIOLOGY | Facility: HOSPITAL | Age: 76
End: 2021-06-09

## 2021-06-09 DIAGNOSIS — J18.9 UNRESOLVED PNEUMONIA: Primary | ICD-10-CM

## 2021-06-09 DIAGNOSIS — J18.9 UNRESOLVED PNEUMONIA: ICD-10-CM

## 2021-06-09 PROCEDURE — 71046 X-RAY EXAM CHEST 2 VIEWS: CPT

## 2021-06-14 ENCOUNTER — OFFICE VISIT (OUTPATIENT)
Dept: ORTHOPEDIC SURGERY | Facility: CLINIC | Age: 76
End: 2021-06-14

## 2021-06-14 VITALS — HEIGHT: 59 IN | HEART RATE: 82 BPM | OXYGEN SATURATION: 96 % | BODY MASS INDEX: 29.23 KG/M2 | WEIGHT: 145 LBS

## 2021-06-14 DIAGNOSIS — M25.572 LEFT ANKLE PAIN, UNSPECIFIED CHRONICITY: ICD-10-CM

## 2021-06-14 DIAGNOSIS — S82.892D CLOSED FRACTURE OF LEFT ANKLE WITH ROUTINE HEALING, SUBSEQUENT ENCOUNTER: Primary | ICD-10-CM

## 2021-06-14 PROBLEM — S82.892A CLOSED FRACTURE OF LEFT ANKLE: Status: ACTIVE | Noted: 2021-06-14

## 2021-06-14 PROCEDURE — 99213 OFFICE O/P EST LOW 20 MIN: CPT | Performed by: STUDENT IN AN ORGANIZED HEALTH CARE EDUCATION/TRAINING PROGRAM

## 2021-06-14 RX ORDER — LEVOFLOXACIN 500 MG/1
TABLET, FILM COATED ORAL
COMMUNITY
Start: 2021-06-09 | End: 2021-01-01

## 2021-06-14 RX ORDER — CLOPIDOGREL BISULFATE 75 MG/1
75 TABLET ORAL DAILY
COMMUNITY
Start: 2021-06-09 | End: 2021-01-01

## 2021-06-14 RX ORDER — LISINOPRIL 10 MG/1
10 TABLET ORAL DAILY
COMMUNITY
Start: 2021-06-09 | End: 2021-01-01

## 2021-06-14 RX ORDER — FUROSEMIDE 20 MG/1
TABLET ORAL
COMMUNITY
End: 2021-01-01

## 2021-06-14 RX ORDER — ALBUTEROL SULFATE 90 UG/1
1 AEROSOL, METERED RESPIRATORY (INHALATION) EVERY 6 HOURS PRN
COMMUNITY

## 2021-06-14 RX ORDER — POTASSIUM CHLORIDE 20 MEQ/1
20 TABLET, EXTENDED RELEASE ORAL DAILY
COMMUNITY
Start: 2021-06-09 | End: 2022-01-01

## 2021-06-14 RX ORDER — CARVEDILOL 12.5 MG/1
TABLET ORAL
COMMUNITY
End: 2021-01-01

## 2021-06-14 NOTE — PROGRESS NOTES
"Chief Complaint  Pain of the Left Ankle    Subjective          Rochelle Serrano presents to [facility] for   History of Present Illness    Nafisa returns for follow-up of her left ankle. She has a bimalleolar ankle fracture from an injury on 2/21/2021. We've been treating her nonoperatively. She was casted, transitioned to weight-bear as tolerated in a fracture boot, and has now been weightbearing in an ankle brace. She is doing therapy. She denies any pain. She uses crutches when outside of the house but is able to ambulate in the house without any assistive devices. She is happy with her therapy progress. She denies any new injuries.    Allergies   Allergen Reactions   • Acetaminophen Swelling   • Flu Virus Vaccine Confusion   • Sulfa Antibiotics Hives   • Codeine Rash   • Erythromycin Rash   • Penicillins Rash        Social History     Socioeconomic History   • Marital status:      Spouse name: Not on file   • Number of children: Not on file   • Years of education: Not on file   • Highest education level: Not on file   Tobacco Use   • Smoking status: Never Smoker   • Smokeless tobacco: Never Used   Vaping Use   • Vaping Use: Never used   Substance and Sexual Activity   • Alcohol use: Never   • Drug use: Never        REVIEW OF SYSTEMS    Constitutional: Denies fevers, chills, weight loss  Cardiovascular: Denies chest pain, shortness of breath  Skin: Denies rashes, acute skin changes  Neurologic: Denies headache, loss of consciousness  MSK: Left ankle swelling      Objective   Vital Signs:   Pulse 82   Ht 149.9 cm (59\")   Wt 65.8 kg (145 lb)   SpO2 96%   BMI 29.29 kg/m²     Body mass index is 29.29 kg/m².    Physical Exam    General: Alert, no acute distress  Gait: Slight antalgia favoring the left ankle  left lower extremity: Mild diffuse swelling. Patient reports this is stable giving history of DVT in left lower extremity. Nontender over the lateral malleolus. " Nontender over the medial malleolus. Ankle range of motion 5 degrees dorsiflexion and 25 degrees plantarflexion. Ankle stable to stress. Sensation intact to light touch over the dorsal and plantar foot. Demonstrates active ankle motion. Palpable dorsalis pedis pulse.    Procedures    Imaging Results (Most Recent)     Procedure Component Value Units Date/Time    XR Ankle 3+ View Left [[id number]] Resulted: 06/14/21 1129     Updated: 06/14/21 1131    Narrative:      AP, oblique, lateral x-rays left ankle: Lateral malleolus healing with   bridging callus noted laterally and posteriorly.  Medial malleolus   avulsion fracture appears unchanged.  Talus remains reduced beneath the   tibia.  Syndesmosis appropriately aligned.  No other acute fractures   noted.           Result Review :   The following data was reviewed by: Blake Acuña MD on 06/14/2021:  Data reviewed: Radiologic studies Left ankle x-rays obtained today             Assessment and Plan    Diagnoses and all orders for this visit:    1. Closed fracture of left ankle with routine healing, subsequent encounter (Primary)    2. Left ankle pain, unspecified chronicity  -     XR Ankle 3+ View Left        Nafisa has a left bimalleolar type ankle fracture. Her lateral malleolus appears to be healing appropriately on x-rays. Her medial malleolar avulsion fracture is stable. Her ankle remains well aligned. She is doing well with therapy. We'll continue PT for the next 6 weeks. She may transition out of the ankle brace and off her assistive devices as able. We will obtain new x-rays of the left ankle at her 6-week follow-up visit.      Follow Up   Return in about 6 weeks (around 7/26/2021).  Patient was given instructions and counseling regarding her condition or for health maintenance advice. Please see specific information pulled into the AVS if appropriate.

## 2021-07-15 VITALS — BODY MASS INDEX: 29.43 KG/M2 | HEIGHT: 59 IN | WEIGHT: 146 LBS

## 2021-07-26 ENCOUNTER — OFFICE VISIT (OUTPATIENT)
Dept: ORTHOPEDIC SURGERY | Facility: CLINIC | Age: 76
End: 2021-07-26

## 2021-07-26 VITALS — HEIGHT: 59 IN | OXYGEN SATURATION: 91 % | WEIGHT: 164 LBS | HEART RATE: 88 BPM | BODY MASS INDEX: 33.06 KG/M2

## 2021-07-26 DIAGNOSIS — M25.572 LEFT ANKLE PAIN, UNSPECIFIED CHRONICITY: ICD-10-CM

## 2021-07-26 DIAGNOSIS — S82.892D CLOSED FRACTURE OF LEFT ANKLE WITH ROUTINE HEALING, SUBSEQUENT ENCOUNTER: Primary | ICD-10-CM

## 2021-07-26 PROCEDURE — 99213 OFFICE O/P EST LOW 20 MIN: CPT | Performed by: STUDENT IN AN ORGANIZED HEALTH CARE EDUCATION/TRAINING PROGRAM

## 2021-07-26 NOTE — PROGRESS NOTES
"Chief Complaint  Follow-up of the Left Ankle    Subjective          Rochelle Serrano presents to [facility] for   History of Present Illness    Rochelle returns today for follow up of left ankle. She has a bimalleolar ankle fracture from an injury on 2/21/2021. We've been treating her nonoperatively. Graduated to weightbearing in ankle brace last visit. Patient states she is still wearing brace at all times. Patient denies any pain at this time.  She is not currently going to therapy due to a recent loss in the family.  She is walking with no assistive devices.  She denies any pain.  She denies any focal swelling.  Allergies   Allergen Reactions   • Acetaminophen Swelling   • Flu Virus Vaccine Confusion   • Sulfa Antibiotics Hives   • Codeine Rash   • Erythromycin Rash   • Penicillins Rash        Social History     Socioeconomic History   • Marital status:      Spouse name: Not on file   • Number of children: Not on file   • Years of education: Not on file   • Highest education level: Not on file   Tobacco Use   • Smoking status: Never Smoker   • Smokeless tobacco: Never Used   Vaping Use   • Vaping Use: Never used   Substance and Sexual Activity   • Alcohol use: Never   • Drug use: Never        I reviewed the patient's chief complaint, history of present illness, review of systems, past medical history, surgical history, family history, social history, medications, and allergy list.     REVIEW OF SYSTEMS    Constitutional: Denies fevers, chills, weight loss  Cardiovascular: Denies chest pain, shortness of breath  Skin: Denies rashes, acute skin changes  Neurologic: Denies headache, loss of consciousness  MSK: Ankle pain      Objective   Vital Signs:   Pulse 88   Ht 149.9 cm (59\")   Wt 74.4 kg (164 lb)   SpO2 91%   BMI 33.12 kg/m²     Body mass index is 33.12 kg/m².    Physical Exam    General: Alert, no acute distress  Gait: Nonantalgic  Left lower extremity: mild diffuse " swelling. Nontender over lateral and medial malleolus. 20 degree dorsiflexion. 10 degree plantarflexion. Ankle stable to ligamentous stress. Palpable dorsalis pedis pulse. Sensation intact over dorsal and plantar foot.  Demonstrates active ankle plantarflexion and dorsiflexion.  Calf soft.    Procedures    Imaging Results (Most Recent)     Procedure Component Value Units Date/Time    XR Ankle 3+ View Left [[id number]] Resulted: 07/26/21 1258     Updated: 07/26/21 1300    Narrative:      Indications: Follow-up left ankle fracture    Views: AP, lateral, oblique left ankle    Findings: Healed left lateral malleolus fracture.  Avulsion fracture at   the tip of the medial malleolus stable.  Talus well reduced beneath the   tibia.  Syndesmosis well aligned.    Comparative Data: Comparative data found and reviewed today                     Assessment and Plan    Diagnoses and all orders for this visit:    1. Closed fracture of left ankle with routine healing, subsequent encounter (Primary)    2. Left ankle pain, unspecified chronicity  -     XR Ankle 3+ View Erich Byrd is 5 months out from injury.  Her x-rays show a healed fracture.  We discussed management going forward.  She would like to continue with home exercises in lieu of formal physical therapy.  She may wear the brace as needed.  Follow up 4 weeks to reevaluate.  We will obtain new x-rays of the left ankle when she returns.    Scribed for Blake Acuña MD by Nancy Graham MA.  07/26/21   10:43 EDT    Follow Up   Return in about 4 weeks (around 8/23/2021).   Follow up in 4 weeks  Patient was given instructions and counseling regarding her condition or for health maintenance advice. Please see specific information pulled into the AVS if appropriate.

## 2021-12-21 NOTE — PROGRESS NOTES
[facility]  Cardiology progress Note    Patient Name: Rochelle Serrano  : 1945    CHIEF COMPLAINT  CORONARY ARTERY DISEASE, pericardial effusion.      Subjective   Subjective     HISTORY OF PRESENT ILLNESS    Rochelle Serrano is a 76 y.o. female with history of COPD, coronary artery s/p CABG surgery and pericardial effusion.  Shortness of breath is stable.    Review of Systems:   Constitutional no fever,  no weight loss   Skin no rash   Otolaryngeal no difficulty swallowing   Cardiovascular See HPI   Pulmonary no cough, no sputum production   Gastrointestinal no constipation, no diarrhea   Genitourinary no dysuria, no hematuria   Hematologic no easy bruisability, no abnormal bleeding   Musculoskeletal no muscle pain   Neurologic no dizziness, no falls         Personal History     Social History:  reports that she has never smoked. She has never used smokeless tobacco. She reports that she does not drink alcohol and does not use drugs.    Home Medications:  Current Outpatient Medications on File Prior to Visit   Medication Sig   • albuterol (PROVENTIL) (5 MG/ML) 0.5% nebulizer solution albuterol sulfate 2.5 mg/0.5 mL inhalation solution for nebulization use in nebulizer as directed  As needed   Active   • albuterol sulfate HFA (Ventolin HFA) 108 (90 Base) MCG/ACT inhaler Ventolin HFA 90 mcg/actuation inhalation HFA aerosol inhaler inhale 1 puff (90 mcg) by inhalation route every 6 hours as needed   Active   • carvedilol (COREG) 12.5 MG tablet TAKE ONE TABLET BY MOUTH TWO TIMES A DAY   • furosemide (LASIX) 20 MG tablet TAKE 1 AND 1/2 TABLETS BY MOUTH EVERY DAY   • potassium chloride (K-DUR,KLOR-CON) 20 MEQ CR tablet Take 20 mEq by mouth Daily.   • [DISCONTINUED] clopidogrel (PLAVIX) 75 MG tablet TAKE ONE TABLET BY MOUTH EVERY DAY   • [DISCONTINUED] lisinopril (PRINIVIL,ZESTRIL) 10 MG tablet TAKE ONE TABLET BY MOUTH EVERY DAY   • [DISCONTINUED] levoFLOXacin (LEVAQUIN) 500 MG tablet TAKE ONE  TABLET BY MOUTH EVERY DAY FOR 7 DAYS     No current facility-administered medications on file prior to visit.     Allergies:  Allergies   Allergen Reactions   • Acetaminophen Swelling   • Flu Virus Vaccine Confusion   • Sulfa Antibiotics Hives   • Codeine Rash   • Erythromycin Rash   • Penicillins Rash       Objective    Objective       Vitals:   Heart Rate:  [102-104] 104  BP: (132-146)/() 146/86  Body mass index is 34.13 kg/m².     Physical Exam:   Constitutional: Awake, alert, No acute distress    Eyes: PERRLA, sclerae anicteric, no conjunctival injection   HENT: NCAT, mucous membranes moist   Neck: Supple, no thyromegaly, no lymphadenopathy, trachea midline   Respiratory: Clear to auscultation bilaterally, nonlabored respirations    Cardiovascular: RRR, no murmurs or rubs. Palpable pedal pulses bilaterally   Musculoskeletal: No bilateral ankle edema, no cyanosis to extremities   Psychiatric: Appropriate affect, cooperative   Neurologic: Oriented x 3, strength symmetric in all extremities, Cranial Nerves grossly intact to confrontation, speech clear   Skin: No rashes.    Result Review    Result Review:  I have personally reviewed the available results from  [x]  Laboratory  [x]  EKG  [x]  Cardiology  [x]  Medications  [x]  Old records  []  Other:     ECG 12 Lead    Date/Time: 12/21/2021 2:41 PM  Performed by: Robin Bauer MD  Authorized by: Robin Bauer MD   Comparison: compared with previous ECG   Similar to previous ECG  Rhythm: sinus tachycardia  Ectopy: unifocal PVCs  Other findings: non-specific ST-T wave changes    Clinical impression: normal ECG  Comments: Normal sinus rhythm.  No significant changes compared to previous EKG.            Impression/Plan:  1.  Moderately severe pericardial effusion chronic: Patient refusing pericardial window.  All risk benefits have been discussed the patient several times.  2.  Essential hypertension Uncontrolled: Increase Zestril 20 mg once a day.   Continue carvedilol 12.5 mg twice daily.  Monitor blood pressure regularly  3.  Coronary artery s/p CABG stable: Continue Plavix 75 mg once a day.  4.  Chronic diastolic heart failure stable: Continue Lasix 20 mg 1-1/2 tablets daily.  Continue K.Dur 20 mEq once a day.           Robin Bauer MD   12/21/21   14:39 EST

## 2022-01-01 ENCOUNTER — APPOINTMENT (OUTPATIENT)
Dept: GENERAL RADIOLOGY | Facility: HOSPITAL | Age: 77
End: 2022-01-01

## 2022-01-01 ENCOUNTER — TELEPHONE (OUTPATIENT)
Dept: CARDIOLOGY | Facility: CLINIC | Age: 77
End: 2022-01-01

## 2022-01-01 ENCOUNTER — HOSPITAL ENCOUNTER (OUTPATIENT)
Dept: GENERAL RADIOLOGY | Facility: HOSPITAL | Age: 77
Discharge: HOME | End: 2022-05-11
Admitting: FAMILY MEDICINE

## 2022-01-01 ENCOUNTER — HOSPITAL ENCOUNTER (OUTPATIENT)
Facility: HOSPITAL | Age: 77
Discharge: HOME | End: 2022-07-12
Attending: INTERNAL MEDICINE | Admitting: INTERNAL MEDICINE

## 2022-01-01 ENCOUNTER — HOSPITAL ENCOUNTER (EMERGENCY)
Facility: HOSPITAL | Age: 77
Discharge: HOME | End: 2022-07-24
Attending: EMERGENCY MEDICINE | Admitting: EMERGENCY MEDICINE

## 2022-01-01 ENCOUNTER — APPOINTMENT (OUTPATIENT)
Dept: CARDIOLOGY | Facility: HOSPITAL | Age: 77
End: 2022-01-01

## 2022-01-01 ENCOUNTER — HOSPITAL ENCOUNTER (EMERGENCY)
Facility: HOSPITAL | Age: 77
End: 2022-08-11
Attending: EMERGENCY MEDICINE | Admitting: EMERGENCY MEDICINE

## 2022-01-01 ENCOUNTER — APPOINTMENT (OUTPATIENT)
Dept: CT IMAGING | Facility: HOSPITAL | Age: 77
End: 2022-01-01

## 2022-01-01 ENCOUNTER — READMISSION MANAGEMENT (OUTPATIENT)
Dept: CALL CENTER | Facility: HOSPITAL | Age: 77
End: 2022-01-01

## 2022-01-01 ENCOUNTER — TELEPHONE (OUTPATIENT)
Dept: PULMONOLOGY | Facility: CLINIC | Age: 77
End: 2022-01-01

## 2022-01-01 ENCOUNTER — OFFICE VISIT (OUTPATIENT)
Dept: GASTROENTEROLOGY | Facility: CLINIC | Age: 77
End: 2022-01-01

## 2022-01-01 ENCOUNTER — HOSPITAL ENCOUNTER (INPATIENT)
Facility: HOSPITAL | Age: 77
LOS: 4 days | Discharge: HOME | End: 2022-06-24
Attending: INTERNAL MEDICINE | Admitting: INTERNAL MEDICINE

## 2022-01-01 ENCOUNTER — PREP FOR SURGERY (OUTPATIENT)
Dept: OTHER | Facility: HOSPITAL | Age: 77
End: 2022-01-01

## 2022-01-01 ENCOUNTER — OFFICE VISIT (OUTPATIENT)
Dept: CARDIOLOGY | Facility: CLINIC | Age: 77
End: 2022-01-01

## 2022-01-01 ENCOUNTER — TRANSCRIBE ORDERS (OUTPATIENT)
Dept: GENERAL RADIOLOGY | Facility: HOSPITAL | Age: 77
End: 2022-01-01

## 2022-01-01 VITALS
OXYGEN SATURATION: 95 % | HEIGHT: 59 IN | WEIGHT: 165 LBS | RESPIRATION RATE: 22 BRPM | TEMPERATURE: 97.9 F | DIASTOLIC BLOOD PRESSURE: 77 MMHG | HEART RATE: 105 BPM | BODY MASS INDEX: 33.26 KG/M2 | SYSTOLIC BLOOD PRESSURE: 134 MMHG

## 2022-01-01 VITALS
HEIGHT: 60 IN | HEART RATE: 91 BPM | OXYGEN SATURATION: 97 % | SYSTOLIC BLOOD PRESSURE: 131 MMHG | BODY MASS INDEX: 33.57 KG/M2 | DIASTOLIC BLOOD PRESSURE: 59 MMHG | WEIGHT: 171 LBS

## 2022-01-01 VITALS
BODY MASS INDEX: 34.37 KG/M2 | OXYGEN SATURATION: 94 % | SYSTOLIC BLOOD PRESSURE: 128 MMHG | TEMPERATURE: 98 F | HEIGHT: 60 IN | HEART RATE: 94 BPM | DIASTOLIC BLOOD PRESSURE: 98 MMHG | RESPIRATION RATE: 22 BRPM

## 2022-01-01 VITALS
WEIGHT: 173.06 LBS | HEIGHT: 59 IN | TEMPERATURE: 98.6 F | HEART RATE: 91 BPM | DIASTOLIC BLOOD PRESSURE: 80 MMHG | OXYGEN SATURATION: 98 % | BODY MASS INDEX: 34.89 KG/M2 | SYSTOLIC BLOOD PRESSURE: 141 MMHG | RESPIRATION RATE: 18 BRPM

## 2022-01-01 VITALS
HEIGHT: 59 IN | SYSTOLIC BLOOD PRESSURE: 119 MMHG | BODY MASS INDEX: 34.88 KG/M2 | DIASTOLIC BLOOD PRESSURE: 75 MMHG | HEART RATE: 93 BPM | WEIGHT: 173 LBS

## 2022-01-01 VITALS — HEART RATE: 94 BPM

## 2022-01-01 VITALS
SYSTOLIC BLOOD PRESSURE: 144 MMHG | BODY MASS INDEX: 35.36 KG/M2 | OXYGEN SATURATION: 95 % | DIASTOLIC BLOOD PRESSURE: 63 MMHG | HEIGHT: 59 IN | HEART RATE: 64 BPM | WEIGHT: 175.4 LBS

## 2022-01-01 DIAGNOSIS — R68.81 EARLY SATIETY: ICD-10-CM

## 2022-01-01 DIAGNOSIS — I25.810 CORONARY ARTERY DISEASE INVOLVING CORONARY BYPASS GRAFT OF NATIVE HEART WITHOUT ANGINA PECTORIS: ICD-10-CM

## 2022-01-01 DIAGNOSIS — J44.9 CHRONIC OBSTRUCTIVE PULMONARY DISEASE, UNSPECIFIED COPD TYPE: ICD-10-CM

## 2022-01-01 DIAGNOSIS — I25.118 CORONARY ARTERY DISEASE OF NATIVE ARTERY OF NATIVE HEART WITH STABLE ANGINA PECTORIS: Primary | ICD-10-CM

## 2022-01-01 DIAGNOSIS — I25.5 ISCHEMIC DILATED CARDIOMYOPATHY: ICD-10-CM

## 2022-01-01 DIAGNOSIS — Z95.1 HX OF CABG: ICD-10-CM

## 2022-01-01 DIAGNOSIS — I31.39 PERICARDIAL EFFUSION: Primary | ICD-10-CM

## 2022-01-01 DIAGNOSIS — R06.02 SHORTNESS OF BREATH: Primary | ICD-10-CM

## 2022-01-01 DIAGNOSIS — Z78.9 DECREASED ACTIVITIES OF DAILY LIVING (ADL): Primary | ICD-10-CM

## 2022-01-01 DIAGNOSIS — E11.65 TYPE 2 DIABETES MELLITUS WITH HYPERGLYCEMIA, UNSPECIFIED WHETHER LONG TERM INSULIN USE: ICD-10-CM

## 2022-01-01 DIAGNOSIS — I31.39 PERICARDIAL EFFUSION: ICD-10-CM

## 2022-01-01 DIAGNOSIS — I42.0 ISCHEMIC DILATED CARDIOMYOPATHY: ICD-10-CM

## 2022-01-01 DIAGNOSIS — I25.118 CORONARY ARTERY DISEASE OF NATIVE ARTERY OF NATIVE HEART WITH STABLE ANGINA PECTORIS: ICD-10-CM

## 2022-01-01 DIAGNOSIS — I46.9 CARDIAC ARREST: Primary | ICD-10-CM

## 2022-01-01 DIAGNOSIS — I50.32 DIASTOLIC CHF, CHRONIC: ICD-10-CM

## 2022-01-01 DIAGNOSIS — R26.2 DIFFICULTY IN WALKING: ICD-10-CM

## 2022-01-01 DIAGNOSIS — R11.0 NAUSEA: ICD-10-CM

## 2022-01-01 DIAGNOSIS — J44.9 CHRONIC OBSTRUCTIVE PULMONARY DISEASE, UNSPECIFIED COPD TYPE: Primary | ICD-10-CM

## 2022-01-01 DIAGNOSIS — R12 HEARTBURN: ICD-10-CM

## 2022-01-01 DIAGNOSIS — I25.10 CORONARY ARTERY DISEASE INVOLVING NATIVE CORONARY ARTERY OF NATIVE HEART WITHOUT ANGINA PECTORIS: Primary | ICD-10-CM

## 2022-01-01 DIAGNOSIS — E78.5 HYPERLIPIDEMIA LDL GOAL <70: ICD-10-CM

## 2022-01-01 DIAGNOSIS — R10.822 LEFT UPPER QUADRANT ABDOMINAL TENDERNESS WITH REBOUND TENDERNESS: Primary | ICD-10-CM

## 2022-01-01 LAB
ABO GROUP BLD: NORMAL
ABO GROUP BLD: NORMAL
ACT BLD: 126 SECONDS (ref 89–137)
ACT BLD: 277 SECONDS (ref 89–137)
ACT BLD: 294 SECONDS (ref 89–137)
ALBUMIN SERPL-MCNC: 3.4 G/DL (ref 3.5–5.2)
ALBUMIN SERPL-MCNC: 3.4 G/DL (ref 3.5–5.2)
ALBUMIN SERPL-MCNC: 3.5 G/DL (ref 3.5–5.2)
ALBUMIN SERPL-MCNC: 3.5 G/DL (ref 3.5–5.2)
ALBUMIN SERPL-MCNC: 4 G/DL (ref 3.5–5.2)
ALBUMIN/GLOB SERPL: 1.3 G/DL
ALBUMIN/GLOB SERPL: 1.4 G/DL
ALBUMIN/GLOB SERPL: 1.4 G/DL
ALBUMIN/GLOB SERPL: 1.5 G/DL
ALBUMIN/GLOB SERPL: 1.5 G/DL
ALP SERPL-CCNC: 61 U/L (ref 39–117)
ALP SERPL-CCNC: 61 U/L (ref 39–117)
ALP SERPL-CCNC: 64 U/L (ref 39–117)
ALP SERPL-CCNC: 65 U/L (ref 39–117)
ALP SERPL-CCNC: 89 U/L (ref 39–117)
ALT SERPL W P-5'-P-CCNC: 22 U/L (ref 1–33)
ALT SERPL W P-5'-P-CCNC: 25 U/L (ref 1–33)
ALT SERPL W P-5'-P-CCNC: 29 U/L (ref 1–33)
ALT SERPL W P-5'-P-CCNC: 38 U/L (ref 1–33)
ALT SERPL W P-5'-P-CCNC: 54 U/L (ref 1–33)
ANION GAP SERPL CALCULATED.3IONS-SCNC: 11.5 MMOL/L (ref 5–15)
ANION GAP SERPL CALCULATED.3IONS-SCNC: 11.8 MMOL/L (ref 5–15)
ANION GAP SERPL CALCULATED.3IONS-SCNC: 12.2 MMOL/L (ref 5–15)
ANION GAP SERPL CALCULATED.3IONS-SCNC: 12.3 MMOL/L (ref 5–15)
ANION GAP SERPL CALCULATED.3IONS-SCNC: 7.4 MMOL/L (ref 5–15)
ANION GAP SERPL CALCULATED.3IONS-SCNC: 8.3 MMOL/L (ref 5–15)
ANION GAP SERPL CALCULATED.3IONS-SCNC: 8.5 MMOL/L (ref 5–15)
ANION GAP SERPL CALCULATED.3IONS-SCNC: 9.3 MMOL/L (ref 5–15)
APPEARANCE FLD: ABNORMAL
APPEARANCE FLD: ABNORMAL
ARTERIAL PATENCY WRIST A: ABNORMAL
ARTERIAL PATENCY WRIST A: POSITIVE
AST SERPL-CCNC: 18 U/L (ref 1–32)
AST SERPL-CCNC: 20 U/L (ref 1–32)
AST SERPL-CCNC: 23 U/L (ref 1–32)
AST SERPL-CCNC: 31 U/L (ref 1–32)
AST SERPL-CCNC: 42 U/L (ref 1–32)
BACTERIA FLD CULT: NORMAL
BACTERIA SPEC AEROBE CULT: NO GROWTH
BACTERIA SPEC AEROBE CULT: NORMAL
BACTERIA SPEC RESP CULT: NORMAL
BACTERIA UR QL AUTO: ABNORMAL /HPF
BASE EXCESS BLDA CALC-SCNC: -1.9 MMOL/L (ref -2–2)
BASE EXCESS BLDA CALC-SCNC: -10.4 MMOL/L (ref -2–2)
BASE EXCESS BLDA CALC-SCNC: 1.2 MMOL/L (ref -2–2)
BASE EXCESS BLDA CALC-SCNC: 3 MMOL/L (ref -2–2)
BASOPHILS # BLD AUTO: 0.02 10*3/MM3 (ref 0–0.2)
BASOPHILS # BLD AUTO: 0.03 10*3/MM3 (ref 0–0.2)
BASOPHILS # BLD AUTO: 0.04 10*3/MM3 (ref 0–0.2)
BASOPHILS NFR BLD AUTO: 0.2 % (ref 0–1.5)
BASOPHILS NFR BLD AUTO: 0.2 % (ref 0–1.5)
BASOPHILS NFR BLD AUTO: 0.4 % (ref 0–1.5)
BDY SITE: ABNORMAL
BILIRUB SERPL-MCNC: 0.5 MG/DL (ref 0–1.2)
BILIRUB SERPL-MCNC: 0.5 MG/DL (ref 0–1.2)
BILIRUB SERPL-MCNC: 0.6 MG/DL (ref 0–1.2)
BILIRUB SERPL-MCNC: 0.6 MG/DL (ref 0–1.2)
BILIRUB SERPL-MCNC: 0.7 MG/DL (ref 0–1.2)
BILIRUB UR QL STRIP: NEGATIVE
BLD GP AB SCN SERPL QL: NEGATIVE
BUN SERPL-MCNC: 17 MG/DL (ref 8–23)
BUN SERPL-MCNC: 18 MG/DL (ref 8–23)
BUN SERPL-MCNC: 20 MG/DL (ref 8–23)
BUN SERPL-MCNC: 23 MG/DL (ref 8–23)
BUN SERPL-MCNC: 25 MG/DL (ref 8–23)
BUN SERPL-MCNC: 25 MG/DL (ref 8–23)
BUN/CREAT SERPL: 18.9 (ref 7–25)
BUN/CREAT SERPL: 22 (ref 7–25)
BUN/CREAT SERPL: 22.5 (ref 7–25)
BUN/CREAT SERPL: 24.3 (ref 7–25)
BUN/CREAT SERPL: 24.5 (ref 7–25)
BUN/CREAT SERPL: 24.7 (ref 7–25)
BUN/CREAT SERPL: 26.4 (ref 7–25)
BUN/CREAT SERPL: 27.4 (ref 7–25)
CA-I BLDA-SCNC: 0.97 MMOL/L (ref 1.13–1.32)
CA-I BLDA-SCNC: 1.08 MMOL/L (ref 1.13–1.32)
CA-I BLDA-SCNC: 1.12 MMOL/L (ref 1.13–1.32)
CALCIUM SPEC-SCNC: 8.3 MG/DL (ref 8.6–10.5)
CALCIUM SPEC-SCNC: 8.6 MG/DL (ref 8.6–10.5)
CALCIUM SPEC-SCNC: 9 MG/DL (ref 8.6–10.5)
CALCIUM SPEC-SCNC: 9.4 MG/DL (ref 8.6–10.5)
CALCIUM SPEC-SCNC: 9.5 MG/DL (ref 8.6–10.5)
CALCIUM SPEC-SCNC: 9.5 MG/DL (ref 8.6–10.5)
CALCIUM SPEC-SCNC: 9.6 MG/DL (ref 8.6–10.5)
CALCIUM SPEC-SCNC: 9.8 MG/DL (ref 8.6–10.5)
CHLORIDE BLDA-SCNC: 103 MMOL/L (ref 98–106)
CHLORIDE BLDA-SCNC: 103 MMOL/L (ref 98–106)
CHLORIDE BLDA-SCNC: 108 MMOL/L (ref 98–106)
CHLORIDE SERPL-SCNC: 101 MMOL/L (ref 98–107)
CHLORIDE SERPL-SCNC: 102 MMOL/L (ref 98–107)
CHLORIDE SERPL-SCNC: 102 MMOL/L (ref 98–107)
CHLORIDE SERPL-SCNC: 104 MMOL/L (ref 98–107)
CHLORIDE SERPL-SCNC: 104 MMOL/L (ref 98–107)
CHLORIDE SERPL-SCNC: 105 MMOL/L (ref 98–107)
CHLORIDE SERPL-SCNC: 107 MMOL/L (ref 98–107)
CHLORIDE SERPL-SCNC: 99 MMOL/L (ref 98–107)
CLARITY UR: ABNORMAL
CO2 SERPL-SCNC: 21.8 MMOL/L (ref 22–29)
CO2 SERPL-SCNC: 23.5 MMOL/L (ref 22–29)
CO2 SERPL-SCNC: 24.2 MMOL/L (ref 22–29)
CO2 SERPL-SCNC: 26.7 MMOL/L (ref 22–29)
CO2 SERPL-SCNC: 28.5 MMOL/L (ref 22–29)
CO2 SERPL-SCNC: 28.6 MMOL/L (ref 22–29)
CO2 SERPL-SCNC: 28.7 MMOL/L (ref 22–29)
CO2 SERPL-SCNC: 30.7 MMOL/L (ref 22–29)
COHGB MFR BLD: 0.1 % (ref 0–1.5)
COHGB MFR BLD: 0.3 % (ref 0–1.5)
COHGB MFR BLD: 0.3 % (ref 0–1.5)
COHGB MFR BLD: 0.8 % (ref 0–1.5)
COLOR FLD: ABNORMAL
COLOR FLD: ABNORMAL
COLOR UR: YELLOW
CREAT SERPL-MCNC: 0.8 MG/DL (ref 0.57–1)
CREAT SERPL-MCNC: 0.84 MG/DL (ref 0.57–1)
CREAT SERPL-MCNC: 0.87 MG/DL (ref 0.57–1)
CREAT SERPL-MCNC: 0.9 MG/DL (ref 0.57–1)
CREAT SERPL-MCNC: 0.91 MG/DL (ref 0.57–1)
CREAT SERPL-MCNC: 0.93 MG/DL (ref 0.57–1)
CREAT SERPL-MCNC: 1.02 MG/DL (ref 0.57–1)
CREAT SERPL-MCNC: 1.03 MG/DL (ref 0.57–1)
CYTO UR: NORMAL
D-LACTATE SERPL-SCNC: 1.1 MMOL/L (ref 0.5–2)
D-LACTATE SERPL-SCNC: 1.2 MMOL/L (ref 0.5–2)
D-LACTATE SERPL-SCNC: 2.7 MMOL/L (ref 0.5–2)
DEPRECATED RDW RBC AUTO: 44.2 FL (ref 37–54)
DEPRECATED RDW RBC AUTO: 47.1 FL (ref 37–54)
DEPRECATED RDW RBC AUTO: 47.5 FL (ref 37–54)
DEPRECATED RDW RBC AUTO: 48 FL (ref 37–54)
DEPRECATED RDW RBC AUTO: 49.5 FL (ref 37–54)
DEPRECATED RDW RBC AUTO: 49.7 FL (ref 37–54)
DEPRECATED RDW RBC AUTO: 50 FL (ref 37–54)
EGFRCR SERPLBLD CKD-EPI 2021: 55.8 ML/MIN/1.73
EGFRCR SERPLBLD CKD-EPI 2021: 56.4 ML/MIN/1.73
EGFRCR SERPLBLD CKD-EPI 2021: 63 ML/MIN/1.73
EGFRCR SERPLBLD CKD-EPI 2021: 64.7 ML/MIN/1.73
EGFRCR SERPLBLD CKD-EPI 2021: 65.6 ML/MIN/1.73
EGFRCR SERPLBLD CKD-EPI 2021: 68.3 ML/MIN/1.73
EGFRCR SERPLBLD CKD-EPI 2021: 71.2 ML/MIN/1.73
EGFRCR SERPLBLD CKD-EPI 2021: 75.5 ML/MIN/1.73
EOSINOPHIL # BLD AUTO: 0 10*3/MM3 (ref 0–0.4)
EOSINOPHIL # BLD AUTO: 0.1 10*3/MM3 (ref 0–0.4)
EOSINOPHIL # BLD AUTO: 0.11 10*3/MM3 (ref 0–0.4)
EOSINOPHIL # BLD AUTO: 0.14 10*3/MM3 (ref 0–0.4)
EOSINOPHIL # BLD AUTO: 0.15 10*3/MM3 (ref 0–0.4)
EOSINOPHIL NFR BLD AUTO: 0 % (ref 0.3–6.2)
EOSINOPHIL NFR BLD AUTO: 1.3 % (ref 0.3–6.2)
EOSINOPHIL NFR BLD AUTO: 1.4 % (ref 0.3–6.2)
EOSINOPHIL NFR BLD AUTO: 1.4 % (ref 0.3–6.2)
EOSINOPHIL NFR BLD AUTO: 1.8 % (ref 0.3–6.2)
ERYTHROCYTE [DISTWIDTH] IN BLOOD BY AUTOMATED COUNT: 13.2 % (ref 12.3–15.4)
ERYTHROCYTE [DISTWIDTH] IN BLOOD BY AUTOMATED COUNT: 14 % (ref 12.3–15.4)
ERYTHROCYTE [DISTWIDTH] IN BLOOD BY AUTOMATED COUNT: 14.1 % (ref 12.3–15.4)
ERYTHROCYTE [DISTWIDTH] IN BLOOD BY AUTOMATED COUNT: 14.2 % (ref 12.3–15.4)
ERYTHROCYTE [DISTWIDTH] IN BLOOD BY AUTOMATED COUNT: 14.3 % (ref 12.3–15.4)
ERYTHROCYTE [DISTWIDTH] IN BLOOD BY AUTOMATED COUNT: 14.9 % (ref 12.3–15.4)
ERYTHROCYTE [DISTWIDTH] IN BLOOD BY AUTOMATED COUNT: 15 % (ref 12.3–15.4)
FHHB: 3.1 % (ref 0–5)
FHHB: 3.5 % (ref 0–5)
FHHB: 3.6 % (ref 0–5)
FHHB: 6.4 % (ref 0–5)
FLUAV AG NPH QL: NEGATIVE
FLUBV AG NPH QL IA: NEGATIVE
GAS FLOW AIRWAY: 2 LPM
GAS FLOW AIRWAY: 3 LPM
GAS FLOW AIRWAY: 4 LPM
GAS FLOW AIRWAY: 5 LPM
GLOBULIN UR ELPH-MCNC: 2.3 GM/DL
GLOBULIN UR ELPH-MCNC: 2.3 GM/DL
GLOBULIN UR ELPH-MCNC: 2.4 GM/DL
GLOBULIN UR ELPH-MCNC: 2.5 GM/DL
GLOBULIN UR ELPH-MCNC: 3.2 GM/DL
GLUCOSE BLDA-MCNC: 217 MMOL/L (ref 65–99)
GLUCOSE BLDA-MCNC: 271 MMOL/L (ref 65–99)
GLUCOSE BLDA-MCNC: 331 MMOL/L (ref 65–99)
GLUCOSE BLDC GLUCOMTR-MCNC: 139 MG/DL (ref 70–99)
GLUCOSE BLDC GLUCOMTR-MCNC: 140 MG/DL (ref 70–99)
GLUCOSE BLDC GLUCOMTR-MCNC: 141 MG/DL (ref 70–99)
GLUCOSE BLDC GLUCOMTR-MCNC: 147 MG/DL (ref 70–99)
GLUCOSE BLDC GLUCOMTR-MCNC: 152 MG/DL (ref 70–99)
GLUCOSE BLDC GLUCOMTR-MCNC: 157 MG/DL (ref 70–99)
GLUCOSE BLDC GLUCOMTR-MCNC: 157 MG/DL (ref 70–99)
GLUCOSE BLDC GLUCOMTR-MCNC: 166 MG/DL (ref 70–99)
GLUCOSE BLDC GLUCOMTR-MCNC: 170 MG/DL (ref 70–99)
GLUCOSE BLDC GLUCOMTR-MCNC: 188 MG/DL (ref 70–99)
GLUCOSE BLDC GLUCOMTR-MCNC: 199 MG/DL (ref 70–99)
GLUCOSE BLDC GLUCOMTR-MCNC: 222 MG/DL (ref 70–99)
GLUCOSE FLD-MCNC: 133 MG/DL
GLUCOSE SERPL-MCNC: 148 MG/DL (ref 65–99)
GLUCOSE SERPL-MCNC: 155 MG/DL (ref 65–99)
GLUCOSE SERPL-MCNC: 163 MG/DL (ref 65–99)
GLUCOSE SERPL-MCNC: 189 MG/DL (ref 65–99)
GLUCOSE SERPL-MCNC: 189 MG/DL (ref 65–99)
GLUCOSE SERPL-MCNC: 195 MG/DL (ref 65–99)
GLUCOSE SERPL-MCNC: 207 MG/DL (ref 65–99)
GLUCOSE SERPL-MCNC: 239 MG/DL (ref 65–99)
GLUCOSE UR STRIP-MCNC: NEGATIVE MG/DL
GRAM STN SPEC: NORMAL
HCO3 BLDA-SCNC: 18.8 MMOL/L (ref 22–26)
HCO3 BLDA-SCNC: 25.2 MMOL/L (ref 22–26)
HCO3 BLDA-SCNC: 28.3 MMOL/L (ref 22–26)
HCO3 BLDA-SCNC: 29.5 MMOL/L (ref 22–26)
HCT VFR BLD AUTO: 36.7 % (ref 34–46.6)
HCT VFR BLD AUTO: 37.5 % (ref 34–46.6)
HCT VFR BLD AUTO: 38 % (ref 34–46.6)
HCT VFR BLD AUTO: 40 % (ref 34–46.6)
HCT VFR BLD AUTO: 42.6 % (ref 34–46.6)
HCT VFR BLD AUTO: 42.7 % (ref 34–46.6)
HCT VFR BLD AUTO: 43.9 % (ref 34–46.6)
HCT VFR BLD AUTO: 44.6 % (ref 34–46.6)
HGB BLD-MCNC: 11.7 G/DL (ref 12–15.9)
HGB BLD-MCNC: 11.9 G/DL (ref 12–15.9)
HGB BLD-MCNC: 12.1 G/DL (ref 12–15.9)
HGB BLD-MCNC: 13 G/DL (ref 12–15.9)
HGB BLD-MCNC: 13.5 G/DL (ref 12–15.9)
HGB BLD-MCNC: 13.7 G/DL (ref 12–15.9)
HGB BLD-MCNC: 13.9 G/DL (ref 12–15.9)
HGB BLD-MCNC: 14.3 G/DL (ref 12–15.9)
HGB BLDA-MCNC: 12.3 G/DL (ref 11.7–14.6)
HGB BLDA-MCNC: 13.4 G/DL (ref 11.7–14.6)
HGB BLDA-MCNC: 14.3 G/DL (ref 11.7–14.6)
HGB BLDA-MCNC: 16 G/DL (ref 11.7–14.6)
HGB UR QL STRIP.AUTO: ABNORMAL
HOLD SPECIMEN: NORMAL
HOLD SPECIMEN: NORMAL
HYALINE CASTS UR QL AUTO: ABNORMAL /LPF
IMM GRANULOCYTES # BLD AUTO: 0.03 10*3/MM3 (ref 0–0.05)
IMM GRANULOCYTES # BLD AUTO: 0.06 10*3/MM3 (ref 0–0.05)
IMM GRANULOCYTES # BLD AUTO: 0.09 10*3/MM3 (ref 0–0.05)
IMM GRANULOCYTES NFR BLD AUTO: 0.3 % (ref 0–0.5)
IMM GRANULOCYTES NFR BLD AUTO: 0.3 % (ref 0–0.5)
IMM GRANULOCYTES NFR BLD AUTO: 0.4 % (ref 0–0.5)
IMM GRANULOCYTES NFR BLD AUTO: 0.6 % (ref 0–0.5)
IMM GRANULOCYTES NFR BLD AUTO: 0.8 % (ref 0–0.5)
INHALED O2 CONCENTRATION: 36 %
INHALED O2 CONCENTRATION: ABNORMAL %
INR PPP: 0.97 (ref 0.86–1.15)
INR PPP: 1.05 (ref 0.86–1.15)
KETONES UR QL STRIP: NEGATIVE
LAB AP CASE REPORT: NORMAL
LAB AP CLINICAL INFORMATION: NORMAL
LAB AP NON-GYN SPECIMEN ADEQUACY: NORMAL
LACTATE BLDA-SCNC: 1.41 MMOL/L (ref 0.5–2)
LACTATE BLDA-SCNC: 1.95 MMOL/L (ref 0.5–2)
LACTATE BLDA-SCNC: 3.55 MMOL/L (ref 0.5–2)
LACTATE BLDA-SCNC: ABNORMAL MMOL/L
LDH FLD L TO P-CCNC: 245 IU/L
LEUKOCYTE ESTERASE UR QL STRIP.AUTO: ABNORMAL
LYMPHOCYTES # BLD AUTO: 1.42 10*3/MM3 (ref 0.7–3.1)
LYMPHOCYTES # BLD AUTO: 1.81 10*3/MM3 (ref 0.7–3.1)
LYMPHOCYTES # BLD AUTO: 1.87 10*3/MM3 (ref 0.7–3.1)
LYMPHOCYTES # BLD AUTO: 1.9 10*3/MM3 (ref 0.7–3.1)
LYMPHOCYTES # BLD AUTO: 2.1 10*3/MM3 (ref 0.7–3.1)
LYMPHOCYTES NFR BLD AUTO: 13.5 % (ref 19.6–45.3)
LYMPHOCYTES NFR BLD AUTO: 14.6 % (ref 19.6–45.3)
LYMPHOCYTES NFR BLD AUTO: 20.9 % (ref 19.6–45.3)
LYMPHOCYTES NFR BLD AUTO: 22.2 % (ref 19.6–45.3)
LYMPHOCYTES NFR BLD AUTO: 26.4 % (ref 19.6–45.3)
MAGNESIUM SERPL-MCNC: 1.5 MG/DL (ref 1.6–2.4)
MAGNESIUM SERPL-MCNC: 1.7 MG/DL (ref 1.6–2.4)
MAGNESIUM SERPL-MCNC: 1.8 MG/DL (ref 1.6–2.4)
MAGNESIUM SERPL-MCNC: 2.3 MG/DL (ref 1.6–2.4)
MAXIMAL PREDICTED HEART RATE: 142 BPM
MCH RBC QN AUTO: 28.8 PG (ref 26.6–33)
MCH RBC QN AUTO: 28.8 PG (ref 26.6–33)
MCH RBC QN AUTO: 29 PG (ref 26.6–33)
MCH RBC QN AUTO: 29.2 PG (ref 26.6–33)
MCH RBC QN AUTO: 29.3 PG (ref 26.6–33)
MCH RBC QN AUTO: 29.5 PG (ref 26.6–33)
MCH RBC QN AUTO: 30.3 PG (ref 26.6–33)
MCHC RBC AUTO-ENTMCNC: 31.2 G/DL (ref 31.5–35.7)
MCHC RBC AUTO-ENTMCNC: 31.7 G/DL (ref 31.5–35.7)
MCHC RBC AUTO-ENTMCNC: 31.7 G/DL (ref 31.5–35.7)
MCHC RBC AUTO-ENTMCNC: 31.8 G/DL (ref 31.5–35.7)
MCHC RBC AUTO-ENTMCNC: 32.1 G/DL (ref 31.5–35.7)
MCHC RBC AUTO-ENTMCNC: 32.5 G/DL (ref 31.5–35.7)
MCHC RBC AUTO-ENTMCNC: 32.6 G/DL (ref 31.5–35.7)
MCV RBC AUTO: 89.7 FL (ref 79–97)
MCV RBC AUTO: 90.5 FL (ref 79–97)
MCV RBC AUTO: 90.7 FL (ref 79–97)
MCV RBC AUTO: 91.2 FL (ref 79–97)
MCV RBC AUTO: 92 FL (ref 79–97)
MCV RBC AUTO: 93 FL (ref 79–97)
MCV RBC AUTO: 93.9 FL (ref 79–97)
METHGB BLD QL: 0.1 % (ref 0–1.5)
METHGB BLD QL: 0.1 % (ref 0–1.5)
METHGB BLD QL: 0.3 % (ref 0–1.5)
METHGB BLD QL: 0.3 % (ref 0–1.5)
MODALITY: ABNORMAL
MONOCYTES # BLD AUTO: 0.69 10*3/MM3 (ref 0.1–0.9)
MONOCYTES # BLD AUTO: 0.76 10*3/MM3 (ref 0.1–0.9)
MONOCYTES # BLD AUTO: 0.78 10*3/MM3 (ref 0.1–0.9)
MONOCYTES # BLD AUTO: 0.91 10*3/MM3 (ref 0.1–0.9)
MONOCYTES # BLD AUTO: 1.48 10*3/MM3 (ref 0.1–0.9)
MONOCYTES NFR BLD AUTO: 10.5 % (ref 5–12)
MONOCYTES NFR BLD AUTO: 7.8 % (ref 5–12)
MONOCYTES NFR BLD AUTO: 9.3 % (ref 5–12)
MONOCYTES NFR BLD AUTO: 9.5 % (ref 5–12)
MONOCYTES NFR BLD AUTO: 9.6 % (ref 5–12)
MYCOBACTERIUM SPEC CULT: NORMAL
NEUTROPHILS NFR BLD AUTO: 11.82 10*3/MM3 (ref 1.7–7)
NEUTROPHILS NFR BLD AUTO: 4.43 10*3/MM3 (ref 1.7–7)
NEUTROPHILS NFR BLD AUTO: 5.57 10*3/MM3 (ref 1.7–7)
NEUTROPHILS NFR BLD AUTO: 5.79 10*3/MM3 (ref 1.7–7)
NEUTROPHILS NFR BLD AUTO: 61.4 % (ref 42.7–76)
NEUTROPHILS NFR BLD AUTO: 65.9 % (ref 42.7–76)
NEUTROPHILS NFR BLD AUTO: 66.8 % (ref 42.7–76)
NEUTROPHILS NFR BLD AUTO: 7.34 10*3/MM3 (ref 1.7–7)
NEUTROPHILS NFR BLD AUTO: 75.5 % (ref 42.7–76)
NEUTROPHILS NFR BLD AUTO: 76.2 % (ref 42.7–76)
NIGHT BLUE STAIN TISS: NORMAL
NITRITE UR QL STRIP: NEGATIVE
NOTE: ABNORMAL
NRBC BLD AUTO-RTO: 0 /100 WBC (ref 0–0.2)
NT-PROBNP SERPL-MCNC: 7163 PG/ML (ref 0–1800)
NUC CELL # FLD: 0 /MM3
NUC CELL # FLD: 0 /MM3
OXYHGB MFR BLDV: 93.2 % (ref 94–99)
OXYHGB MFR BLDV: 95.5 % (ref 94–99)
OXYHGB MFR BLDV: 95.9 % (ref 94–99)
OXYHGB MFR BLDV: 96.5 % (ref 94–99)
PATH REPORT.FINAL DX SPEC: NORMAL
PATH REPORT.GROSS SPEC: NORMAL
PCO2 BLDA: 51.7 MM HG (ref 35–45)
PCO2 BLDA: 52.8 MM HG (ref 35–45)
PCO2 BLDA: 55.9 MM HG (ref 35–45)
PCO2 BLDA: 56.4 MM HG (ref 35–45)
PH BLDA: 7.14 PH UNITS (ref 7.35–7.45)
PH BLDA: 7.31 PH UNITS (ref 7.35–7.45)
PH BLDA: 7.32 PH UNITS (ref 7.35–7.45)
PH BLDA: 7.37 PH UNITS (ref 7.35–7.45)
PH FLD: 8.5 [PH]
PH UR STRIP.AUTO: <=5 [PH] (ref 5–8)
PHOSPHATE SERPL-MCNC: 2.3 MG/DL (ref 2.5–4.5)
PHOSPHATE SERPL-MCNC: 2.9 MG/DL (ref 2.5–4.5)
PHOSPHATE SERPL-MCNC: 3.1 MG/DL (ref 2.5–4.5)
PHOSPHATE SERPL-MCNC: 3.9 MG/DL (ref 2.5–4.5)
PLATELET # BLD AUTO: 123 10*3/MM3 (ref 140–450)
PLATELET # BLD AUTO: 134 10*3/MM3 (ref 140–450)
PLATELET # BLD AUTO: 148 10*3/MM3 (ref 140–450)
PLATELET # BLD AUTO: 199 10*3/MM3 (ref 140–450)
PLATELET # BLD AUTO: 220 10*3/MM3 (ref 140–450)
PLATELET # BLD AUTO: 228 10*3/MM3 (ref 140–450)
PLATELET # BLD AUTO: 229 10*3/MM3 (ref 140–450)
PMV BLD AUTO: 10.2 FL (ref 6–12)
PMV BLD AUTO: 10.3 FL (ref 6–12)
PMV BLD AUTO: 10.5 FL (ref 6–12)
PMV BLD AUTO: 10.6 FL (ref 6–12)
PMV BLD AUTO: 10.7 FL (ref 6–12)
PO2 BLD: 257 MM[HG] (ref 0–500)
PO2 BLD: ABNORMAL MM[HG]
PO2 BLDA: 124 MM HG (ref 80–100)
PO2 BLDA: 72.4 MM HG (ref 80–100)
PO2 BLDA: 91.3 MM HG (ref 80–100)
PO2 BLDA: 92.5 MM HG (ref 80–100)
POTASSIUM BLDA-SCNC: 4.2 MMOL/L (ref 3.5–5)
POTASSIUM BLDA-SCNC: 4.66 MMOL/L (ref 3.5–5)
POTASSIUM BLDA-SCNC: 5.33 MMOL/L (ref 3.5–5)
POTASSIUM SERPL-SCNC: 4.2 MMOL/L (ref 3.5–5.2)
POTASSIUM SERPL-SCNC: 4.4 MMOL/L (ref 3.5–5.2)
POTASSIUM SERPL-SCNC: 4.4 MMOL/L (ref 3.5–5.2)
POTASSIUM SERPL-SCNC: 4.5 MMOL/L (ref 3.5–5.2)
POTASSIUM SERPL-SCNC: 4.6 MMOL/L (ref 3.5–5.2)
POTASSIUM SERPL-SCNC: 4.8 MMOL/L (ref 3.5–5.2)
POTASSIUM SERPL-SCNC: 4.9 MMOL/L (ref 3.5–5.2)
POTASSIUM SERPL-SCNC: 5 MMOL/L (ref 3.5–5.2)
PROCALCITONIN SERPL-MCNC: 0.05 NG/ML (ref 0–0.25)
PROT FLD-MCNC: 5.1 G/DL
PROT FLD-MCNC: 5.2 G/DL
PROT SERPL-MCNC: 5.7 G/DL (ref 6–8.5)
PROT SERPL-MCNC: 5.8 G/DL (ref 6–8.5)
PROT SERPL-MCNC: 5.8 G/DL (ref 6–8.5)
PROT SERPL-MCNC: 6 G/DL (ref 6–8.5)
PROT SERPL-MCNC: 7.2 G/DL (ref 6–8.5)
PROT UR QL STRIP: ABNORMAL
PROTHROMBIN TIME: 13 SECONDS (ref 11.8–14.9)
PROTHROMBIN TIME: 13.8 SECONDS (ref 11.8–14.9)
QT INTERVAL: 345 MS
QT INTERVAL: 346 MS
QT INTERVAL: 368 MS
QT INTERVAL: 375 MS
QT INTERVAL: 385 MS
RBC # BLD AUTO: 4.11 10*6/MM3 (ref 3.77–5.28)
RBC # BLD AUTO: 4.2 10*6/MM3 (ref 3.77–5.28)
RBC # BLD AUTO: 4.41 10*6/MM3 (ref 3.77–5.28)
RBC # BLD AUTO: 4.63 10*6/MM3 (ref 3.77–5.28)
RBC # BLD AUTO: 4.72 10*6/MM3 (ref 3.77–5.28)
RBC # BLD AUTO: 4.75 10*6/MM3 (ref 3.77–5.28)
RBC # BLD AUTO: 4.76 10*6/MM3 (ref 3.77–5.28)
RBC # FLD AUTO: ABNORMAL /MM3
RBC # FLD AUTO: ABNORMAL /MM3
RBC # UR STRIP: ABNORMAL /HPF
REF LAB TEST METHOD: ABNORMAL
REF LAB TEST METHOD: NORMAL
RH BLD: POSITIVE
RH BLD: POSITIVE
SAO2 % BLDCOA: 93.6 % (ref 95–99)
SAO2 % BLDCOA: 96.4 % (ref 95–99)
SAO2 % BLDCOA: 96.5 % (ref 95–99)
SAO2 % BLDCOA: 96.9 % (ref 95–99)
SARS-COV-2 RNA PNL SPEC NAA+PROBE: NOT DETECTED
SODIUM BLDA-SCNC: 134.1 MMOL/L (ref 136–146)
SODIUM BLDA-SCNC: 138.1 MMOL/L (ref 136–146)
SODIUM BLDA-SCNC: 138.8 MMOL/L (ref 136–146)
SODIUM SERPL-SCNC: 135 MMOL/L (ref 136–145)
SODIUM SERPL-SCNC: 137 MMOL/L (ref 136–145)
SODIUM SERPL-SCNC: 140 MMOL/L (ref 136–145)
SODIUM SERPL-SCNC: 141 MMOL/L (ref 136–145)
SODIUM SERPL-SCNC: 142 MMOL/L (ref 136–145)
SP GR FLD: 1.03
SP GR UR STRIP: >1.03 (ref 1–1.03)
SQUAMOUS #/AREA URNS HPF: ABNORMAL /HPF
STRESS TARGET HR: 121 BPM
T&S EXPIRATION DATE: NORMAL
TROPONIN T SERPL-MCNC: 0.04 NG/ML (ref 0–0.03)
TROPONIN T SERPL-MCNC: 0.04 NG/ML (ref 0–0.03)
UROBILINOGEN UR QL STRIP: ABNORMAL
WBC # UR STRIP: ABNORMAL /HPF
WBC NRBC COR # BLD: 11.09 10*3/MM3 (ref 3.4–10.8)
WBC NRBC COR # BLD: 15.52 10*3/MM3 (ref 3.4–10.8)
WBC NRBC COR # BLD: 7.21 10*3/MM3 (ref 3.4–10.8)
WBC NRBC COR # BLD: 8.27 10*3/MM3 (ref 3.4–10.8)
WBC NRBC COR # BLD: 8.43 10*3/MM3 (ref 3.4–10.8)
WBC NRBC COR # BLD: 8.67 10*3/MM3 (ref 3.4–10.8)
WBC NRBC COR # BLD: 9.73 10*3/MM3 (ref 3.4–10.8)
WHOLE BLOOD HOLD COAG: NORMAL
WHOLE BLOOD HOLD SPECIMEN: NORMAL

## 2022-01-01 PROCEDURE — 99232 SBSQ HOSP IP/OBS MODERATE 35: CPT | Performed by: INTERNAL MEDICINE

## 2022-01-01 PROCEDURE — 84100 ASSAY OF PHOSPHORUS: CPT | Performed by: PHYSICIAN ASSISTANT

## 2022-01-01 PROCEDURE — 93005 ELECTROCARDIOGRAM TRACING: CPT | Performed by: INTERNAL MEDICINE

## 2022-01-01 PROCEDURE — 85014 HEMATOCRIT: CPT | Performed by: INTERNAL MEDICINE

## 2022-01-01 PROCEDURE — B2111ZZ FLUOROSCOPY OF MULTIPLE CORONARY ARTERIES USING LOW OSMOLAR CONTRAST: ICD-10-PCS | Performed by: INTERNAL MEDICINE

## 2022-01-01 PROCEDURE — 80053 COMPREHEN METABOLIC PANEL: CPT | Performed by: EMERGENCY MEDICINE

## 2022-01-01 PROCEDURE — 85347 COAGULATION TIME ACTIVATED: CPT

## 2022-01-01 PROCEDURE — C1887 CATHETER, GUIDING: HCPCS | Performed by: INTERNAL MEDICINE

## 2022-01-01 PROCEDURE — 63710000001 CLOPIDOGREL 75 MG TABLET: Performed by: INTERNAL MEDICINE

## 2022-01-01 PROCEDURE — 87206 SMEAR FLUORESCENT/ACID STAI: CPT | Performed by: INTERNAL MEDICINE

## 2022-01-01 PROCEDURE — 87070 CULTURE OTHR SPECIMN AEROBIC: CPT | Performed by: INTERNAL MEDICINE

## 2022-01-01 PROCEDURE — 63710000001 ASPIRIN 81 MG TABLET DELAYED-RELEASE: Performed by: INTERNAL MEDICINE

## 2022-01-01 PROCEDURE — C1725 CATH, TRANSLUMIN NON-LASER: HCPCS | Performed by: INTERNAL MEDICINE

## 2022-01-01 PROCEDURE — U0005 INFEC AGEN DETEC AMPLI PROBE: HCPCS | Performed by: EMERGENCY MEDICINE

## 2022-01-01 PROCEDURE — C1874 STENT, COATED/COV W/DEL SYS: HCPCS | Performed by: INTERNAL MEDICINE

## 2022-01-01 PROCEDURE — 99291 CRITICAL CARE FIRST HOUR: CPT | Performed by: INTERNAL MEDICINE

## 2022-01-01 PROCEDURE — 85025 COMPLETE CBC W/AUTO DIFF WBC: CPT | Performed by: INTERNAL MEDICINE

## 2022-01-01 PROCEDURE — 36600 WITHDRAWAL OF ARTERIAL BLOOD: CPT | Performed by: PHYSICIAN ASSISTANT

## 2022-01-01 PROCEDURE — 84157 ASSAY OF PROTEIN OTHER: CPT | Performed by: INTERNAL MEDICINE

## 2022-01-01 PROCEDURE — 94799 UNLISTED PULMONARY SVC/PX: CPT

## 2022-01-01 PROCEDURE — 99152 MOD SED SAME PHYS/QHP 5/>YRS: CPT | Performed by: INTERNAL MEDICINE

## 2022-01-01 PROCEDURE — C1769 GUIDE WIRE: HCPCS | Performed by: INTERNAL MEDICINE

## 2022-01-01 PROCEDURE — 94761 N-INVAS EAR/PLS OXIMETRY MLT: CPT

## 2022-01-01 PROCEDURE — 82805 BLOOD GASES W/O2 SATURATION: CPT | Performed by: PHYSICIAN ASSISTANT

## 2022-01-01 PROCEDURE — 87040 BLOOD CULTURE FOR BACTERIA: CPT | Performed by: INTERNAL MEDICINE

## 2022-01-01 PROCEDURE — 86850 RBC ANTIBODY SCREEN: CPT | Performed by: INTERNAL MEDICINE

## 2022-01-01 PROCEDURE — 71046 X-RAY EXAM CHEST 2 VIEWS: CPT

## 2022-01-01 PROCEDURE — 99153 MOD SED SAME PHYS/QHP EA: CPT | Performed by: INTERNAL MEDICINE

## 2022-01-01 PROCEDURE — 92928 PRQ TCAT PLMT NTRAC ST 1 LES: CPT | Performed by: INTERNAL MEDICINE

## 2022-01-01 PROCEDURE — 25010000002 LORAZEPAM PER 2 MG: Performed by: INTERNAL MEDICINE

## 2022-01-01 PROCEDURE — 89050 BODY FLUID CELL COUNT: CPT | Performed by: INTERNAL MEDICINE

## 2022-01-01 PROCEDURE — 84315 BODY FLUID SPECIFIC GRAVITY: CPT | Performed by: INTERNAL MEDICINE

## 2022-01-01 PROCEDURE — 85610 PROTHROMBIN TIME: CPT | Performed by: INTERNAL MEDICINE

## 2022-01-01 PROCEDURE — 83605 ASSAY OF LACTIC ACID: CPT | Performed by: INTERNAL MEDICINE

## 2022-01-01 PROCEDURE — 81001 URINALYSIS AUTO W/SCOPE: CPT | Performed by: INTERNAL MEDICINE

## 2022-01-01 PROCEDURE — 80048 BASIC METABOLIC PNL TOTAL CA: CPT | Performed by: INTERNAL MEDICINE

## 2022-01-01 PROCEDURE — 93458 L HRT ARTERY/VENTRICLE ANGIO: CPT | Performed by: INTERNAL MEDICINE

## 2022-01-01 PROCEDURE — 86900 BLOOD TYPING SEROLOGIC ABO: CPT

## 2022-01-01 PROCEDURE — 25010000002 FENTANYL CITRATE (PF) 50 MCG/ML SOLUTION: Performed by: INTERNAL MEDICINE

## 2022-01-01 PROCEDURE — 25010000002 CALCIUM GLUCONATE-NACL 1-0.675 GM/50ML-% SOLUTION: Performed by: PHYSICIAN ASSISTANT

## 2022-01-01 PROCEDURE — C9803 HOPD COVID-19 SPEC COLLECT: HCPCS | Performed by: EMERGENCY MEDICINE

## 2022-01-01 PROCEDURE — 82375 ASSAY CARBOXYHB QUANT: CPT | Performed by: INTERNAL MEDICINE

## 2022-01-01 PROCEDURE — 84484 ASSAY OF TROPONIN QUANT: CPT | Performed by: EMERGENCY MEDICINE

## 2022-01-01 PROCEDURE — 96374 THER/PROPH/DIAG INJ IV PUSH: CPT

## 2022-01-01 PROCEDURE — 93005 ELECTROCARDIOGRAM TRACING: CPT | Performed by: EMERGENCY MEDICINE

## 2022-01-01 PROCEDURE — 86900 BLOOD TYPING SEROLOGIC ABO: CPT | Performed by: INTERNAL MEDICINE

## 2022-01-01 PROCEDURE — A9270 NON-COVERED ITEM OR SERVICE: HCPCS | Performed by: INTERNAL MEDICINE

## 2022-01-01 PROCEDURE — 0W9D3ZZ DRAINAGE OF PERICARDIAL CAVITY, PERCUTANEOUS APPROACH: ICD-10-PCS | Performed by: INTERNAL MEDICINE

## 2022-01-01 PROCEDURE — 88185 FLOWCYTOMETRY/TC ADD-ON: CPT

## 2022-01-01 PROCEDURE — 87205 SMEAR GRAM STAIN: CPT | Performed by: INTERNAL MEDICINE

## 2022-01-01 PROCEDURE — 71045 X-RAY EXAM CHEST 1 VIEW: CPT

## 2022-01-01 PROCEDURE — 82962 GLUCOSE BLOOD TEST: CPT

## 2022-01-01 PROCEDURE — C1894 INTRO/SHEATH, NON-LASER: HCPCS | Performed by: INTERNAL MEDICINE

## 2022-01-01 PROCEDURE — 86920 COMPATIBILITY TEST SPIN: CPT

## 2022-01-01 PROCEDURE — 83605 ASSAY OF LACTIC ACID: CPT | Performed by: EMERGENCY MEDICINE

## 2022-01-01 PROCEDURE — 83050 HGB METHEMOGLOBIN QUAN: CPT | Performed by: PHYSICIAN ASSISTANT

## 2022-01-01 PROCEDURE — 99214 OFFICE O/P EST MOD 30 MIN: CPT | Performed by: SPECIALIST

## 2022-01-01 PROCEDURE — 97116 GAIT TRAINING THERAPY: CPT

## 2022-01-01 PROCEDURE — 83735 ASSAY OF MAGNESIUM: CPT | Performed by: PHYSICIAN ASSISTANT

## 2022-01-01 PROCEDURE — 80053 COMPREHEN METABOLIC PANEL: CPT | Performed by: PHYSICIAN ASSISTANT

## 2022-01-01 PROCEDURE — 0 IOPAMIDOL PER 1 ML: Performed by: INTERNAL MEDICINE

## 2022-01-01 PROCEDURE — 85027 COMPLETE CBC AUTOMATED: CPT | Performed by: PHYSICIAN ASSISTANT

## 2022-01-01 PROCEDURE — 63710000001 INSULIN LISPRO (HUMAN) PER 5 UNITS: Performed by: INTERNAL MEDICINE

## 2022-01-01 PROCEDURE — 86901 BLOOD TYPING SEROLOGIC RH(D): CPT

## 2022-01-01 PROCEDURE — 87804 INFLUENZA ASSAY W/OPTIC: CPT | Performed by: EMERGENCY MEDICINE

## 2022-01-01 PROCEDURE — 25010000002 FUROSEMIDE PER 20 MG: Performed by: EMERGENCY MEDICINE

## 2022-01-01 PROCEDURE — 82375 ASSAY CARBOXYHB QUANT: CPT | Performed by: EMERGENCY MEDICINE

## 2022-01-01 PROCEDURE — 93010 ELECTROCARDIOGRAM REPORT: CPT | Performed by: INTERNAL MEDICINE

## 2022-01-01 PROCEDURE — 82375 ASSAY CARBOXYHB QUANT: CPT | Performed by: PHYSICIAN ASSISTANT

## 2022-01-01 PROCEDURE — 83050 HGB METHEMOGLOBIN QUAN: CPT | Performed by: INTERNAL MEDICINE

## 2022-01-01 PROCEDURE — 97530 THERAPEUTIC ACTIVITIES: CPT

## 2022-01-01 PROCEDURE — 87040 BLOOD CULTURE FOR BACTERIA: CPT | Performed by: EMERGENCY MEDICINE

## 2022-01-01 PROCEDURE — 97165 OT EVAL LOW COMPLEX 30 MIN: CPT

## 2022-01-01 PROCEDURE — 94760 N-INVAS EAR/PLS OXIMETRY 1: CPT

## 2022-01-01 PROCEDURE — 82945 GLUCOSE OTHER FLUID: CPT | Performed by: INTERNAL MEDICINE

## 2022-01-01 PROCEDURE — 99233 SBSQ HOSP IP/OBS HIGH 50: CPT | Performed by: INTERNAL MEDICINE

## 2022-01-01 PROCEDURE — 87116 MYCOBACTERIA CULTURE: CPT | Performed by: INTERNAL MEDICINE

## 2022-01-01 PROCEDURE — 85018 HEMOGLOBIN: CPT | Performed by: INTERNAL MEDICINE

## 2022-01-01 PROCEDURE — 94640 AIRWAY INHALATION TREATMENT: CPT

## 2022-01-01 PROCEDURE — 83735 ASSAY OF MAGNESIUM: CPT | Performed by: INTERNAL MEDICINE

## 2022-01-01 PROCEDURE — 0 MAGNESIUM SULFATE 4 GM/100ML SOLUTION: Performed by: PHYSICIAN ASSISTANT

## 2022-01-01 PROCEDURE — 80053 COMPREHEN METABOLIC PANEL: CPT | Performed by: INTERNAL MEDICINE

## 2022-01-01 PROCEDURE — 99284 EMERGENCY DEPT VISIT MOD MDM: CPT

## 2022-01-01 PROCEDURE — 25010000002 CEFTRIAXONE PER 250 MG: Performed by: INTERNAL MEDICINE

## 2022-01-01 PROCEDURE — 71275 CT ANGIOGRAPHY CHEST: CPT

## 2022-01-01 PROCEDURE — 25010000002 EPINEPHRINE 1 MG/10ML SOLUTION PREFILLED SYRINGE: Performed by: EMERGENCY MEDICINE

## 2022-01-01 PROCEDURE — 86901 BLOOD TYPING SEROLOGIC RH(D): CPT | Performed by: INTERNAL MEDICINE

## 2022-01-01 PROCEDURE — U0004 COV-19 TEST NON-CDC HGH THRU: HCPCS | Performed by: EMERGENCY MEDICINE

## 2022-01-01 PROCEDURE — 83050 HGB METHEMOGLOBIN QUAN: CPT | Performed by: EMERGENCY MEDICINE

## 2022-01-01 PROCEDURE — 0 MAGNESIUM SULFATE 4 GM/100ML SOLUTION: Performed by: INTERNAL MEDICINE

## 2022-01-01 PROCEDURE — 97161 PT EVAL LOW COMPLEX 20 MIN: CPT

## 2022-01-01 PROCEDURE — 36415 COLL VENOUS BLD VENIPUNCTURE: CPT

## 2022-01-01 PROCEDURE — 25010000002 MIDAZOLAM PER 1 MG: Performed by: INTERNAL MEDICINE

## 2022-01-01 PROCEDURE — 88108 CYTOPATH CONCENTRATE TECH: CPT | Performed by: INTERNAL MEDICINE

## 2022-01-01 PROCEDURE — 94762 N-INVAS EAR/PLS OXIMTRY CONT: CPT

## 2022-01-01 PROCEDURE — 84145 PROCALCITONIN (PCT): CPT | Performed by: EMERGENCY MEDICINE

## 2022-01-01 PROCEDURE — 83880 ASSAY OF NATRIURETIC PEPTIDE: CPT | Performed by: EMERGENCY MEDICINE

## 2022-01-01 PROCEDURE — 0 MEPERIDINE PER 100 MG: Performed by: INTERNAL MEDICINE

## 2022-01-01 PROCEDURE — 92950 HEART/LUNG RESUSCITATION CPR: CPT

## 2022-01-01 PROCEDURE — 94660 CPAP INITIATION&MGMT: CPT

## 2022-01-01 PROCEDURE — 82805 BLOOD GASES W/O2 SATURATION: CPT | Performed by: INTERNAL MEDICINE

## 2022-01-01 PROCEDURE — 99213 OFFICE O/P EST LOW 20 MIN: CPT | Performed by: INTERNAL MEDICINE

## 2022-01-01 PROCEDURE — 85027 COMPLETE CBC AUTOMATED: CPT | Performed by: INTERNAL MEDICINE

## 2022-01-01 PROCEDURE — 63710000001 ASPIRIN 81 MG CHEWABLE TABLET: Performed by: INTERNAL MEDICINE

## 2022-01-01 PROCEDURE — 85025 COMPLETE CBC W/AUTO DIFF WBC: CPT | Performed by: EMERGENCY MEDICINE

## 2022-01-01 PROCEDURE — 25010000002 FENTANYL CITRATE (PF) 100 MCG/2ML SOLUTION: Performed by: INTERNAL MEDICINE

## 2022-01-01 PROCEDURE — 25010000002 VANCOMYCIN 5 G RECONSTITUTED SOLUTION: Performed by: INTERNAL MEDICINE

## 2022-01-01 PROCEDURE — 25010000002 HEPARIN (PORCINE) PER 1000 UNITS: Performed by: INTERNAL MEDICINE

## 2022-01-01 PROCEDURE — B2151ZZ FLUOROSCOPY OF LEFT HEART USING LOW OSMOLAR CONTRAST: ICD-10-PCS | Performed by: INTERNAL MEDICINE

## 2022-01-01 PROCEDURE — 93308 TTE F-UP OR LMTD: CPT | Performed by: INTERNAL MEDICINE

## 2022-01-01 PROCEDURE — 93005 ELECTROCARDIOGRAM TRACING: CPT

## 2022-01-01 PROCEDURE — 36600 WITHDRAWAL OF ARTERIAL BLOOD: CPT | Performed by: EMERGENCY MEDICINE

## 2022-01-01 PROCEDURE — 33016 PERICARDIOCENTESIS W/IMAGING: CPT | Performed by: INTERNAL MEDICINE

## 2022-01-01 PROCEDURE — 99214 OFFICE O/P EST MOD 30 MIN: CPT

## 2022-01-01 PROCEDURE — 82805 BLOOD GASES W/O2 SATURATION: CPT | Performed by: EMERGENCY MEDICINE

## 2022-01-01 PROCEDURE — 83615 LACTATE (LD) (LDH) ENZYME: CPT | Performed by: INTERNAL MEDICINE

## 2022-01-01 PROCEDURE — 93308 TTE F-UP OR LMTD: CPT

## 2022-01-01 PROCEDURE — 4A023N7 MEASUREMENT OF CARDIAC SAMPLING AND PRESSURE, LEFT HEART, PERCUTANEOUS APPROACH: ICD-10-PCS | Performed by: INTERNAL MEDICINE

## 2022-01-01 PROCEDURE — 87086 URINE CULTURE/COLONY COUNT: CPT | Performed by: INTERNAL MEDICINE

## 2022-01-01 PROCEDURE — 36430 TRANSFUSION BLD/BLD COMPNT: CPT

## 2022-01-01 PROCEDURE — C1729 CATH, DRAINAGE: HCPCS | Performed by: INTERNAL MEDICINE

## 2022-01-01 PROCEDURE — C9600 PERC DRUG-EL COR STENT SING: HCPCS | Performed by: INTERNAL MEDICINE

## 2022-01-01 PROCEDURE — P9016 RBC LEUKOCYTES REDUCED: HCPCS

## 2022-01-01 PROCEDURE — 83986 ASSAY PH BODY FLUID NOS: CPT | Performed by: INTERNAL MEDICINE

## 2022-01-01 PROCEDURE — 99239 HOSP IP/OBS DSCHRG MGMT >30: CPT | Performed by: INTERNAL MEDICINE

## 2022-01-01 PROCEDURE — 88184 FLOWCYTOMETRY/ TC 1 MARKER: CPT

## 2022-01-01 DEVICE — XIENCE SKYPOINT™ EVEROLIMUS ELUTING CORONARY STENT SYSTEM 2.75 MM X 23 MM / RAPID-EXCHANGE
Type: IMPLANTABLE DEVICE | Status: FUNCTIONAL
Brand: XIENCE SKYPOINT™

## 2022-01-01 RX ORDER — MIDAZOLAM HYDROCHLORIDE 1 MG/ML
INJECTION INTRAMUSCULAR; INTRAVENOUS AS NEEDED
Status: DISCONTINUED | OUTPATIENT
Start: 2022-01-01 | End: 2022-01-01 | Stop reason: HOSPADM

## 2022-01-01 RX ORDER — ASPIRIN 81 MG/1
81 TABLET ORAL DAILY
Qty: 90 TABLET | Refills: 3 | Status: SHIPPED | OUTPATIENT
Start: 2022-01-01

## 2022-01-01 RX ORDER — LIDOCAINE HYDROCHLORIDE 20 MG/ML
INJECTION, SOLUTION INFILTRATION; PERINEURAL AS NEEDED
Status: DISCONTINUED | OUTPATIENT
Start: 2022-01-01 | End: 2022-01-01 | Stop reason: HOSPADM

## 2022-01-01 RX ORDER — POLYETHYLENE GLYCOL 3350 17 G/17G
17 POWDER, FOR SOLUTION ORAL DAILY PRN
Status: DISCONTINUED | OUTPATIENT
Start: 2022-01-01 | End: 2022-01-01

## 2022-01-01 RX ORDER — SODIUM CHLORIDE 0.9 % (FLUSH) 0.9 %
10 SYRINGE (ML) INJECTION AS NEEDED
Status: CANCELLED | OUTPATIENT
Start: 2022-01-01

## 2022-01-01 RX ORDER — ATORVASTATIN CALCIUM 20 MG/1
20 TABLET, FILM COATED ORAL DAILY
Qty: 90 TABLET | Refills: 3 | Status: SHIPPED | OUTPATIENT
Start: 2022-01-01

## 2022-01-01 RX ORDER — EZETIMIBE 10 MG/1
10 TABLET ORAL DAILY
Qty: 90 TABLET | Refills: 0 | Status: SHIPPED | OUTPATIENT
Start: 2022-01-01 | End: 2022-01-01

## 2022-01-01 RX ORDER — SODIUM PHOSPHATE IN D5W 15MMOL/250
15 PLASTIC BAG, INJECTION (ML) INTRAVENOUS ONCE
Status: COMPLETED | OUTPATIENT
Start: 2022-01-01 | End: 2022-01-01

## 2022-01-01 RX ORDER — SODIUM CHLORIDE 0.9 % (FLUSH) 0.9 %
10 SYRINGE (ML) INJECTION AS NEEDED
Status: DISCONTINUED | OUTPATIENT
Start: 2022-01-01 | End: 2022-01-01 | Stop reason: HOSPADM

## 2022-01-01 RX ORDER — FENTANYL CITRATE 50 UG/ML
INJECTION, SOLUTION INTRAMUSCULAR; INTRAVENOUS AS NEEDED
Status: DISCONTINUED | OUTPATIENT
Start: 2022-01-01 | End: 2022-01-01 | Stop reason: HOSPADM

## 2022-01-01 RX ORDER — SODIUM CHLORIDE 0.9 % (FLUSH) 0.9 %
10 SYRINGE (ML) INJECTION AS NEEDED
Status: DISCONTINUED | OUTPATIENT
Start: 2022-01-01 | End: 2022-01-01

## 2022-01-01 RX ORDER — CALCIUM CHLORIDE 100 MG/ML
INJECTION INTRAVENOUS; INTRAVENTRICULAR
Status: COMPLETED | OUTPATIENT
Start: 2022-01-01 | End: 2022-01-01

## 2022-01-01 RX ORDER — DEXTROSE MONOHYDRATE 25 G/50ML
25 INJECTION, SOLUTION INTRAVENOUS
Status: DISCONTINUED | OUTPATIENT
Start: 2022-01-01 | End: 2022-01-01 | Stop reason: HOSPADM

## 2022-01-01 RX ORDER — IPRATROPIUM BROMIDE AND ALBUTEROL SULFATE 2.5; .5 MG/3ML; MG/3ML
3 SOLUTION RESPIRATORY (INHALATION)
COMMUNITY
Start: 2022-01-01

## 2022-01-01 RX ORDER — MAGNESIUM SULFATE HEPTAHYDRATE 40 MG/ML
4 INJECTION, SOLUTION INTRAVENOUS ONCE
Status: COMPLETED | OUTPATIENT
Start: 2022-01-01 | End: 2022-01-01

## 2022-01-01 RX ORDER — BISACODYL 10 MG
10 SUPPOSITORY, RECTAL RECTAL DAILY PRN
Status: DISCONTINUED | OUTPATIENT
Start: 2022-01-01 | End: 2022-01-01

## 2022-01-01 RX ORDER — ASPIRIN 81 MG/1
81 TABLET ORAL DAILY
Status: DISCONTINUED | OUTPATIENT
Start: 2022-01-01 | End: 2022-01-01 | Stop reason: HOSPADM

## 2022-01-01 RX ORDER — CALCIUM GLUCONATE 20 MG/ML
1 INJECTION, SOLUTION INTRAVENOUS ONCE
Status: COMPLETED | OUTPATIENT
Start: 2022-01-01 | End: 2022-01-01

## 2022-01-01 RX ORDER — HEPARIN SODIUM 1000 [USP'U]/ML
INJECTION, SOLUTION INTRAVENOUS; SUBCUTANEOUS AS NEEDED
Status: DISCONTINUED | OUTPATIENT
Start: 2022-01-01 | End: 2022-01-01 | Stop reason: HOSPADM

## 2022-01-01 RX ORDER — SODIUM CHLORIDE 9 MG/ML
100 INJECTION, SOLUTION INTRAVENOUS CONTINUOUS
Status: DISCONTINUED | OUTPATIENT
Start: 2022-01-01 | End: 2022-01-01

## 2022-01-01 RX ORDER — INSULIN LISPRO 100 [IU]/ML
0-7 INJECTION, SOLUTION INTRAVENOUS; SUBCUTANEOUS
Status: DISCONTINUED | OUTPATIENT
Start: 2022-01-01 | End: 2022-01-01 | Stop reason: HOSPADM

## 2022-01-01 RX ORDER — NOREPINEPHRINE BIT/0.9 % NACL 8 MG/250ML
.02-.3 INFUSION BOTTLE (ML) INTRAVENOUS
Status: DISCONTINUED | OUTPATIENT
Start: 2022-01-01 | End: 2022-01-01

## 2022-01-01 RX ORDER — POLYETHYLENE GLYCOL 3350 17 G/17G
17 POWDER, FOR SOLUTION ORAL DAILY
Status: DISCONTINUED | OUTPATIENT
Start: 2022-01-01 | End: 2022-01-01 | Stop reason: HOSPADM

## 2022-01-01 RX ORDER — LORAZEPAM 2 MG/ML
INJECTION INTRAMUSCULAR AS NEEDED
Status: DISCONTINUED | OUTPATIENT
Start: 2022-01-01 | End: 2022-01-01 | Stop reason: HOSPADM

## 2022-01-01 RX ORDER — MEPERIDINE HYDROCHLORIDE 25 MG/ML
INJECTION INTRAMUSCULAR; INTRAVENOUS; SUBCUTANEOUS AS NEEDED
Status: DISCONTINUED | OUTPATIENT
Start: 2022-01-01 | End: 2022-01-01 | Stop reason: HOSPADM

## 2022-01-01 RX ORDER — DAPAGLIFLOZIN 10 MG/1
10 TABLET, FILM COATED ORAL DAILY
Qty: 90 TABLET | Refills: 1 | Status: SHIPPED | OUTPATIENT
Start: 2022-01-01 | End: 2022-01-01

## 2022-01-01 RX ORDER — SODIUM CHLORIDE 0.9 % (FLUSH) 0.9 %
10 SYRINGE (ML) INJECTION EVERY 12 HOURS SCHEDULED
Status: DISCONTINUED | OUTPATIENT
Start: 2022-01-01 | End: 2022-01-01

## 2022-01-01 RX ORDER — FUROSEMIDE 10 MG/ML
20 INJECTION INTRAMUSCULAR; INTRAVENOUS ONCE
Status: COMPLETED | OUTPATIENT
Start: 2022-01-01 | End: 2022-01-01

## 2022-01-01 RX ORDER — POTASSIUM CHLORIDE 20 MEQ/1
TABLET, EXTENDED RELEASE ORAL
Qty: 90 TABLET | Refills: 0 | Status: SHIPPED | OUTPATIENT
Start: 2022-01-01

## 2022-01-01 RX ORDER — SODIUM CHLORIDE 0.9 % (FLUSH) 0.9 %
10 SYRINGE (ML) INJECTION EVERY 12 HOURS SCHEDULED
Status: CANCELLED | OUTPATIENT
Start: 2022-01-01

## 2022-01-01 RX ORDER — CLOPIDOGREL BISULFATE 75 MG/1
75 TABLET ORAL DAILY
Status: DISCONTINUED | OUTPATIENT
Start: 2022-01-01 | End: 2022-01-01 | Stop reason: HOSPADM

## 2022-01-01 RX ORDER — BISACODYL 5 MG/1
5 TABLET, DELAYED RELEASE ORAL DAILY PRN
Status: DISCONTINUED | OUTPATIENT
Start: 2022-01-01 | End: 2022-01-01 | Stop reason: HOSPADM

## 2022-01-01 RX ORDER — NICOTINE POLACRILEX 4 MG
24 LOZENGE BUCCAL
Status: DISCONTINUED | OUTPATIENT
Start: 2022-01-01 | End: 2022-01-01 | Stop reason: HOSPADM

## 2022-01-01 RX ORDER — OMEPRAZOLE 40 MG/1
40 CAPSULE, DELAYED RELEASE ORAL DAILY
Qty: 30 CAPSULE | Refills: 2 | Status: SHIPPED | OUTPATIENT
Start: 2022-01-01 | End: 2022-01-01

## 2022-01-01 RX ORDER — ONDANSETRON 4 MG/1
4 TABLET, FILM COATED ORAL EVERY 6 HOURS PRN
Status: DISCONTINUED | OUTPATIENT
Start: 2022-01-01 | End: 2022-01-01 | Stop reason: HOSPADM

## 2022-01-01 RX ORDER — BISACODYL 10 MG
10 SUPPOSITORY, RECTAL RECTAL DAILY PRN
Status: DISCONTINUED | OUTPATIENT
Start: 2022-01-01 | End: 2022-01-01 | Stop reason: HOSPADM

## 2022-01-01 RX ORDER — SODIUM CHLORIDE 9 MG/ML
100 INJECTION, SOLUTION INTRAVENOUS CONTINUOUS
Status: ACTIVE | OUTPATIENT
Start: 2022-01-01 | End: 2022-01-01

## 2022-01-01 RX ORDER — EPINEPHRINE 0.1 MG/ML
SYRINGE (ML) INJECTION
Status: COMPLETED | OUTPATIENT
Start: 2022-01-01 | End: 2022-01-01

## 2022-01-01 RX ORDER — AMOXICILLIN 250 MG
2 CAPSULE ORAL 2 TIMES DAILY
Status: DISCONTINUED | OUTPATIENT
Start: 2022-01-01 | End: 2022-01-01

## 2022-01-01 RX ORDER — BISACODYL 5 MG/1
5 TABLET, DELAYED RELEASE ORAL DAILY PRN
Status: DISCONTINUED | OUTPATIENT
Start: 2022-01-01 | End: 2022-01-01

## 2022-01-01 RX ORDER — SODIUM CHLORIDE 0.9 % (FLUSH) 0.9 %
10 SYRINGE (ML) INJECTION EVERY 12 HOURS SCHEDULED
Status: DISCONTINUED | OUTPATIENT
Start: 2022-01-01 | End: 2022-01-01 | Stop reason: HOSPADM

## 2022-01-01 RX ORDER — VERAPAMIL HYDROCHLORIDE 2.5 MG/ML
INJECTION, SOLUTION INTRAVENOUS AS NEEDED
Status: DISCONTINUED | OUTPATIENT
Start: 2022-01-01 | End: 2022-01-01 | Stop reason: HOSPADM

## 2022-01-01 RX ORDER — AMOXICILLIN 250 MG
2 CAPSULE ORAL 2 TIMES DAILY
Status: DISCONTINUED | OUTPATIENT
Start: 2022-01-01 | End: 2022-01-01 | Stop reason: HOSPADM

## 2022-01-01 RX ORDER — ASPIRIN 81 MG/1
TABLET, CHEWABLE ORAL AS NEEDED
Status: DISCONTINUED | OUTPATIENT
Start: 2022-01-01 | End: 2022-01-01 | Stop reason: HOSPADM

## 2022-01-01 RX ORDER — NOREPINEPHRINE BIT/0.9 % NACL 8 MG/250ML
INFUSION BOTTLE (ML) INTRAVENOUS CONTINUOUS PRN
Status: COMPLETED | OUTPATIENT
Start: 2022-01-01 | End: 2022-01-01

## 2022-01-01 RX ORDER — ONDANSETRON 2 MG/ML
4 INJECTION INTRAMUSCULAR; INTRAVENOUS EVERY 6 HOURS PRN
Status: DISCONTINUED | OUTPATIENT
Start: 2022-01-01 | End: 2022-01-01 | Stop reason: HOSPADM

## 2022-01-01 RX ORDER — CEFTRIAXONE SODIUM 1 G/50ML
1 INJECTION, SOLUTION INTRAVENOUS EVERY 24 HOURS
Status: COMPLETED | OUTPATIENT
Start: 2022-01-01 | End: 2022-01-01

## 2022-01-01 RX ORDER — TRAMADOL HYDROCHLORIDE 50 MG/1
25 TABLET ORAL EVERY 4 HOURS PRN
Status: DISCONTINUED | OUTPATIENT
Start: 2022-01-01 | End: 2022-01-01 | Stop reason: HOSPADM

## 2022-01-01 RX ORDER — CLOPIDOGREL BISULFATE 75 MG/1
TABLET ORAL AS NEEDED
Status: DISCONTINUED | OUTPATIENT
Start: 2022-01-01 | End: 2022-01-01 | Stop reason: HOSPADM

## 2022-01-01 RX ORDER — IPRATROPIUM BROMIDE AND ALBUTEROL SULFATE 2.5; .5 MG/3ML; MG/3ML
3 SOLUTION RESPIRATORY (INHALATION) EVERY 4 HOURS PRN
Status: DISCONTINUED | OUTPATIENT
Start: 2022-01-01 | End: 2022-01-01 | Stop reason: HOSPADM

## 2022-01-01 RX ORDER — PHENYLEPHRINE HCL IN 0.9% NACL 1 MG/10 ML
SYRINGE (ML) INTRAVENOUS AS NEEDED
Status: DISCONTINUED | OUTPATIENT
Start: 2022-01-01 | End: 2022-01-01 | Stop reason: HOSPADM

## 2022-01-01 RX ORDER — PANTOPRAZOLE SODIUM 40 MG/1
40 TABLET, DELAYED RELEASE ORAL
Status: DISCONTINUED | OUTPATIENT
Start: 2022-01-01 | End: 2022-01-01 | Stop reason: HOSPADM

## 2022-01-01 RX ADMIN — INSULIN LISPRO 2 UNITS: 100 INJECTION, SOLUTION INTRAVENOUS; SUBCUTANEOUS at 08:28

## 2022-01-01 RX ADMIN — PANTOPRAZOLE SODIUM 40 MG: 40 TABLET, DELAYED RELEASE ORAL at 05:08

## 2022-01-01 RX ADMIN — SODIUM PHOSPHATE, MONOBASIC, MONOHYDRATE 15 MMOL: 276; 142 INJECTION, SOLUTION INTRAVENOUS at 07:52

## 2022-01-01 RX ADMIN — MAGNESIUM OXIDE TAB 400 MG (241.3 MG ELEMENTAL MG) 800 MG: 400 (241.3 MG) TAB at 10:19

## 2022-01-01 RX ADMIN — Medication 0.3 MCG/KG/MIN: at 16:13

## 2022-01-01 RX ADMIN — MAGNESIUM SULFATE 4 G: 4 INJECTION INTRAVENOUS at 08:37

## 2022-01-01 RX ADMIN — SODIUM CHLORIDE 100 ML/HR: 9 INJECTION, SOLUTION INTRAVENOUS at 16:36

## 2022-01-01 RX ADMIN — CALCIUM GLUCONATE 1 G: 20 INJECTION, SOLUTION INTRAVENOUS at 07:46

## 2022-01-01 RX ADMIN — CEFTRIAXONE SODIUM 1 G: 1 INJECTION, SOLUTION INTRAVENOUS at 11:50

## 2022-01-01 RX ADMIN — SODIUM CHLORIDE 1000 ML: 9 INJECTION, SOLUTION INTRAVENOUS at 18:37

## 2022-01-01 RX ADMIN — EPINEPHRINE 1 MG: 0.1 INJECTION INTRACARDIAC; INTRAVENOUS at 23:52

## 2022-01-01 RX ADMIN — CALCIUM CHLORIDE 1 G: 100 INJECTION INTRAVENOUS; INTRAVENTRICULAR at 23:57

## 2022-01-01 RX ADMIN — TRAMADOL HYDROCHLORIDE 25 MG: 50 TABLET, FILM COATED ORAL at 20:42

## 2022-01-01 RX ADMIN — INSULIN LISPRO 2 UNITS: 100 INJECTION, SOLUTION INTRAVENOUS; SUBCUTANEOUS at 08:37

## 2022-01-01 RX ADMIN — PANTOPRAZOLE SODIUM 40 MG: 40 TABLET, DELAYED RELEASE ORAL at 05:54

## 2022-01-01 RX ADMIN — IOPAMIDOL 100 ML: 755 INJECTION, SOLUTION INTRAVENOUS at 15:22

## 2022-01-01 RX ADMIN — INSULIN LISPRO 3 UNITS: 100 INJECTION, SOLUTION INTRAVENOUS; SUBCUTANEOUS at 11:50

## 2022-01-01 RX ADMIN — CEFTRIAXONE SODIUM 1 G: 1 INJECTION, SOLUTION INTRAVENOUS at 13:23

## 2022-01-01 RX ADMIN — INSULIN LISPRO 2 UNITS: 100 INJECTION, SOLUTION INTRAVENOUS; SUBCUTANEOUS at 17:05

## 2022-01-01 RX ADMIN — PANTOPRAZOLE SODIUM 40 MG: 40 TABLET, DELAYED RELEASE ORAL at 05:40

## 2022-01-01 RX ADMIN — Medication 1000 MG: at 10:59

## 2022-01-01 RX ADMIN — CEFTRIAXONE SODIUM 1 G: 1 INJECTION, SOLUTION INTRAVENOUS at 10:23

## 2022-01-01 RX ADMIN — MAGNESIUM SULFATE 4 G: 4 INJECTION INTRAVENOUS at 08:48

## 2022-01-01 RX ADMIN — SODIUM CHLORIDE 100 ML/HR: 9 INJECTION, SOLUTION INTRAVENOUS at 20:53

## 2022-01-01 RX ADMIN — INSULIN LISPRO 2 UNITS: 100 INJECTION, SOLUTION INTRAVENOUS; SUBCUTANEOUS at 13:22

## 2022-01-01 RX ADMIN — IPRATROPIUM BROMIDE AND ALBUTEROL SULFATE 3 ML: 2.5; .5 SOLUTION RESPIRATORY (INHALATION) at 23:58

## 2022-01-01 RX ADMIN — EPINEPHRINE 1 MG: 0.1 INJECTION INTRACARDIAC; INTRAVENOUS at 23:56

## 2022-01-01 RX ADMIN — SODIUM BICARBONATE 50 MEQ: 84 INJECTION INTRAVENOUS at 23:57

## 2022-01-01 RX ADMIN — INSULIN LISPRO 2 UNITS: 100 INJECTION, SOLUTION INTRAVENOUS; SUBCUTANEOUS at 17:15

## 2022-01-01 RX ADMIN — INSULIN LISPRO 2 UNITS: 100 INJECTION, SOLUTION INTRAVENOUS; SUBCUTANEOUS at 20:56

## 2022-01-01 RX ADMIN — CLOPIDOGREL BISULFATE 75 MG: 75 TABLET ORAL at 08:43

## 2022-01-01 RX ADMIN — FUROSEMIDE 20 MG: 10 INJECTION, SOLUTION INTRAMUSCULAR; INTRAVENOUS at 17:21

## 2022-05-27 NOTE — PROGRESS NOTES
Chief Complaint  Diverticulitis and Abdominal Pain    Rochelle Serrano is a 77 y.o. female who presents to [facility] on referral from Latrell Miguel MD for a gastroenterology evaluation of diverticulitis.      History of Present Illness  New patient presents to the office for diverticulitis.  Patient saw PCP on 4/18/2022 with complaints of left lower quadrant pain and bloating.  Patient was prescribed 1 week of Flagyl.  Patient states she does not feel that she had diverticulitis.  After she finished Flagyl she was still experiencing left upper quadrant bloating and distention.  She experiences nausea intermittently.  She has heartburn daily and treats it with Pepcid as needed.  She feels full fast when eating. Denies vomiting and dysphagia.  Denies NSAID use.  Denies constipation, diarrhea, melena, hematochezia, and lower abdominal pain.  Patient states that through all of this she has not had a change in bowel habits.  Patient states she had colonoscopy a few years ago by Dr. Bay and had a few polyps removed.     CT abdomen pelvis with contrast 10/01/2021 -large pericardial effusion, atelectasis in left lower lung, hepatic steatosis, stable hypodense lesion in the spleen likely pseudocyst or angioma, bilateral renal cysts    Past Medical History:   Diagnosis Date   • Arm paresthesia, left    • Arthritis    • Asthma    • CAD (coronary artery disease)    • Carpal tunnel syndrome 02/09/2017   • Carpal tunnel syndrome of left wrist    • Cataract    • Cervical radiculopathy 09/21/2017   • Chest pain    • Congestive heart failure (CHF) ([facility])    • Diabetes ([facility])    • DJD (degenerative joint disease)    • DVT (deep venous thrombosis) ([facility])    • Heart attack ([facility])    • Hyperlipemia    • Hypertension    • Kidney stone    • Left leg pain 03/06/2017   • Limb swelling    • Lumbago 03/06/2017   • MI (myocardial infarction) ([facility])    • Osteoarthritis    • Paresthesia 03/06/2017     LEFT HAND/FOOT   • Peptic ulcer    • Sciatic leg pain 03/06/2017    LEFT    • Shortness of breath        Past Surgical History:   Procedure Laterality Date   • APPENDECTOMY     • CARPAL TUNNEL RELEASE  01/27/2017    LEFT    • CERVICAL FUSION     • COLONOSCOPY     • COLONOSCOPY W/ BIOPSIES AND POLYPECTOMY     • EYE SURGERY      CATARACT SURGERY   • HYSTERECTOMY     • ROTATOR CUFF REPAIR     • THYROIDECTOMY     • UPPER GASTROINTESTINAL ENDOSCOPY     • URETERAL STENT INSERTION     • URETEROLITHOTOMY           Current Outpatient Medications:   •  albuterol (PROVENTIL) (5 MG/ML) 0.5% nebulizer solution, albuterol sulfate 2.5 mg/0.5 mL inhalation solution for nebulization use in nebulizer as directed  As needed   Active, Disp: , Rfl:   •  albuterol sulfate  (90 Base) MCG/ACT inhaler, Ventolin HFA 90 mcg/actuation inhalation HFA aerosol inhaler inhale 1 puff (90 mcg) by inhalation route every 6 hours as needed   Active, Disp: , Rfl:   •  carvedilol (COREG) 12.5 MG tablet, TAKE ONE TABLET BY MOUTH TWO TIMES A DAY, Disp: 180 tablet, Rfl: 3  •  clopidogrel (PLAVIX) 75 MG tablet, Take 1 tablet by mouth Daily., Disp: 90 tablet, Rfl: 3  •  furosemide (LASIX) 20 MG tablet, TAKE 1 AND 1/2 TABLETS BY MOUTH EVERY DAY, Disp: 45 tablet, Rfl: 2  •  ipratropium-albuterol (DUO-NEB) 0.5-2.5 mg/3 ml nebulizer, INHALE CONTENTS OF 1 VIAL USING NEBULIZER MACHINE FOUR TIMES A DAY, Disp: , Rfl:   •  lisinopril (PRINIVIL,ZESTRIL) 20 MG tablet, Take 1 tablet by mouth Daily., Disp: 90 tablet, Rfl: 3  •  potassium chloride (K-DUR,KLOR-CON) 20 MEQ CR tablet, TAKE ONE TABLET BY MOUTH EVERY DAY, Disp: 90 tablet, Rfl: 0  •  omeprazole (priLOSEC) 40 MG capsule, Take 1 capsule by mouth Daily for 30 days., Disp: 30 capsule, Rfl: 2     Allergies   Allergen Reactions   • Acetaminophen Swelling   • Influenza Virus Vaccine Confusion   • Sulfa Antibiotics Hives   • Codeine Rash   • Erythromycin Rash   • Penicillins Rash       Family History   Problem  "Relation Age of Onset   • Heart disease Father    • Heart attack Father    • Heart attack Brother    • Heart disease Daughter    • Cancer Daughter    • Heart attack Daughter    • Diabetes Other    • Colon cancer Neg Hx         Social History     Social History Narrative   • Not on file       Immunization:  Immunization History   Administered Date(s) Administered   • COVID-19 (PFIZER) PURPLE CAP 07/30/2021, 08/20/2021   • Covid-19 (Pfizer) Gray Cap 02/12/2022        Objective     Vital Signs:   /63 (BP Location: Left arm, Patient Position: Sitting, Cuff Size: Adult)   Pulse 64   Ht 149.9 cm (59.02\")   Wt 79.6 kg (175 lb 6.4 oz)   SpO2 95%   BMI 35.41 kg/m²       Physical Exam  Constitutional:       Appearance: Normal appearance.   HENT:      Head: Normocephalic.   Cardiovascular:      Rate and Rhythm: Normal rate and regular rhythm.      Heart sounds: Normal heart sounds.   Pulmonary:      Effort: Pulmonary effort is normal.      Breath sounds: Normal breath sounds.   Abdominal:      General: Bowel sounds are normal.      Palpations: Abdomen is soft.   Skin:     General: Skin is warm and dry.   Neurological:      Mental Status: She is alert and oriented to person, place, and time. Mental status is at baseline.   Psychiatric:         Mood and Affect: Mood normal.         Behavior: Behavior normal.         Thought Content: Thought content normal.         Judgment: Judgment normal.         Result Review :               Assessment and Plan    Diagnoses and all orders for this visit:    1. Left upper quadrant abdominal tenderness with rebound tenderness (Primary)    2. Early satiety    3. Nausea    4. Heartburn    Other orders  -     omeprazole (priLOSEC) 40 MG capsule; Take 1 capsule by mouth Daily for 30 days.  Dispense: 30 capsule; Refill: 2      Discussed the need for EGD and colonoscopy with the patient.  Patient declined.  Patient states she would like to think about it and discuss this further at a " 3-month follow-up appointment.  Discussed the risk of deferring colonoscopy with the patient, patient expressed understanding.  Patient will start omeprazole 40 mg daily to see if this helps with bloating and early satiety. Encouraged small frequent meals, elevate HOB at nights, and do not eat 4 hours before bed to reduce reflux symptoms. Avoid alcohol and trigger foods such as tomato based products and greasy foods.   Patient agreeable plan will call the office any questions or concerns.        Follow Up   Return in about 3 months (around 8/27/2022) for GERD.  Patient was given instructions and counseling regarding her condition or for health maintenance advice. Please see specific information pulled into the AVS if appropriate.

## 2022-06-02 NOTE — TELEPHONE ENCOUNTER
----- Message from SILVER Macedo sent at 6/1/2022  2:31 PM EDT -----  Notify pt echo result: EF 40-45%, previously was 60% on last echo, some muscle weakening. Dr Bauer will address next week at office visit.   Moderately large pericardial effusion, unchanged from previous echocardiogram.

## 2022-06-06 NOTE — PROGRESS NOTES
[facility]  Cardiology progress Note    Patient Name: Rochelle Serrano  : 1945    CHIEF COMPLAINT  Coronary artery disease, pericardial effusion      Subjective   Subjective     HISTORY OF PRESENT ILLNESS    Rochelle Serrano is a 77 y.o. female with history of coronary disease s/p CABG, pericardial effusion.  No chest pain.  Shortness of breath stable.    Review of Systems:   Constitutional no fever,  no weight loss   Skin no rash   Otolaryngeal no difficulty swallowing   Cardiovascular See HPI   Pulmonary no cough, no sputum production   Gastrointestinal no constipation, no diarrhea   Genitourinary no dysuria, no hematuria   Hematologic no easy bruisability, no abnormal bleeding   Musculoskeletal no muscle pain   Neurologic no dizziness, no falls         Personal History     Social History:  reports that she has never smoked. She has never used smokeless tobacco. She reports that she does not drink alcohol and does not use drugs.    Home Medications:  Current Outpatient Medications on File Prior to Visit   Medication Sig   • albuterol (PROVENTIL) (5 MG/ML) 0.5% nebulizer solution albuterol sulfate 2.5 mg/0.5 mL inhalation solution for nebulization use in nebulizer as directed  As needed   Active   • albuterol sulfate  (90 Base) MCG/ACT inhaler Ventolin HFA 90 mcg/actuation inhalation HFA aerosol inhaler inhale 1 puff (90 mcg) by inhalation route every 6 hours as needed   Active   • carvedilol (COREG) 12.5 MG tablet TAKE ONE TABLET BY MOUTH TWO TIMES A DAY   • clopidogrel (PLAVIX) 75 MG tablet Take 1 tablet by mouth Daily.   • furosemide (LASIX) 20 MG tablet TAKE 1 AND 1/2 TABLETS BY MOUTH EVERY DAY   • ipratropium-albuterol (DUO-NEB) 0.5-2.5 mg/3 ml nebulizer INHALE CONTENTS OF 1 VIAL USING NEBULIZER MACHINE FOUR TIMES A DAY   • lisinopril (PRINIVIL,ZESTRIL) 20 MG tablet Take 1 tablet by mouth Daily.   • potassium chloride (K-DUR,KLOR-CON) 20 MEQ CR tablet TAKE ONE TABLET BY MOUTH  EVERY DAY   • omeprazole (priLOSEC) 40 MG capsule Take 1 capsule by mouth Daily for 30 days.     No current facility-administered medications on file prior to visit.     Allergies:  Allergies   Allergen Reactions   • Acetaminophen Swelling   • Influenza Virus Vaccine Confusion   • Sulfa Antibiotics Hives   • Codeine Rash   • Erythromycin Rash   • Penicillins Rash       Objective    Objective       Vitals:   Heart Rate:  [91] 91  BP: (131)/(59) 131/59  Body mass index is 33.4 kg/m².     Physical Exam:   Constitutional: Awake, alert, No acute distress    Eyes: PERRLA, sclerae anicteric, no conjunctival injection   HENT: NCAT, mucous membranes moist   Neck: Supple, no thyromegaly, no lymphadenopathy, trachea midline   Respiratory: Clear to auscultation bilaterally, nonlabored respirations    Cardiovascular: RRR, no murmurs or rubs. Palpable pedal pulses bilaterally   Musculoskeletal: No bilateral ankle edema, no cyanosis to extremities   Psychiatric: Appropriate affect, cooperative   Neurologic: Oriented x 3, strength symmetric in all extremities, Cranial Nerves grossly intact to confrontation, speech clear   Skin: No rashes.    Result Review    Result Review:  I have personally reviewed the available results from  [x]  Laboratory  [x]  EKG  [x]  Cardiology  [x]  Medications  [x]  Old records  []  Other:   Procedures  Results for orders placed in visit on 06/01/22    Adult Transthoracic Echo Complete W/ Cont if Necessary Per Protocol    Interpretation Summary  Fibrocalcific mitral and aortic valves.  Mild diffuse hypokinesis of the left ventricle with mildly reduced left ventricular systolic function ejection fraction 40 to 45%.  Moderately large pericardial effusion.  No change from previous echo.  No evidence of tamponade.  Mild mitral regurgitation.  Trace tricuspid regurgitation     Impression/Plan:  1.  Moderately severe pericardial effusion chronic: Patient still refusing pericardial window.  But agrees for  pericardial tap.  Risk benefits discussed with the patient.  We will schedule this with Dr. Christian.  2.  Essential hypertension Uncontrolled: Increase Zestril 20 mg once a day.  Continue carvedilol 12.5 mg twice daily.  Monitor blood pressure regularly  3.  Coronary artery s/p CABG stable: Continue Plavix 75 mg once a day.  4.  Chronic diastolic heart failure stable: Continue Lasix 20 mg 1-1/2 tablets daily.  Continue K.Dur 20 mEq once a day.                Robin Bauer MD   06/07/22   11:37 EDT

## 2022-06-08 PROBLEM — I31.39 PERICARDIAL EFFUSION: Status: ACTIVE | Noted: 2022-01-01

## 2022-06-25 NOTE — OUTREACH NOTE
Prep Survey    Flowsheet Row Responses   [facility] facility patient discharged from? [facility]   Is LACE score < 7 ? No   Emergency Room discharge w/ pulse ox? No   Eligibility Readm Mgmt   Discharge diagnosis Pericardial effusion    Does the patient have one of the following disease processes/diagnoses(primary or secondary)? Other   Does the patient have Home health ordered? No   Is there a DME ordered? No   Prep survey completed? Yes          RUT CONROY - Registered Nurse

## 2022-06-28 NOTE — OUTREACH NOTE
"Medical Week 1 Survey    Flowsheet Row Responses   [facility] patient discharged from? [facility]   Does the patient have one of the following disease processes/diagnoses(primary or secondary)? Other   Week 1 attempt successful? Yes   Call start time 0949   Call end time 0953   Discharge diagnosis Pericardial effusion    Meds reviewed with patient/caregiver? Yes   Is the patient having any side effects they believe may be caused by any medication additions or changes? No   Does the patient have all medications ordered at discharge? Yes   Is the patient taking all medications as directed (includes completed medication regime)? Yes   Does the patient have a primary care provider?  Yes   Does the patient have an appointment with their PCP within 7 days of discharge? Yes   Has the patient kept scheduled appointments due by today? Yes   Psychosocial issues? No   Did the patient receive a copy of their discharge instructions? Yes   Nursing interventions Reviewed instructions with patient   What is the patient's perception of their health status since discharge? New symptoms unrelated to diagnosis   Is the patient/caregiver able to teach back signs and symptoms related to disease process for when to call PCP? Yes   Is the patient/caregiver able to teach back signs and symptoms related to disease process for when to call 911? Yes   Is the patient/caregiver able to teach back the hierarchy of who to call/visit for symptoms/problems? PCP, Specialist, Home health nurse, Urgent Care, ED, 911 Yes   Additional teach back comments States \"I'm hanging in there\".  States her leg was hurting bad and saw her PCP because she thought it may be a blood clot.  States they checked it and it was fine.  Was told to put ice on it to see if it helps.  Advised if it continues to make another follow up appt.   Week 1 call completed? Yes   Wrap up additional comments Denies questions or needs at this time.          ALONZO ANDERSON - Licensed Nurse  "

## 2022-07-01 PROBLEM — I25.810 CORONARY ARTERY DISEASE INVOLVING CORONARY BYPASS GRAFT OF NATIVE HEART WITHOUT ANGINA PECTORIS: Status: ACTIVE | Noted: 2022-01-01

## 2022-07-01 PROBLEM — I25.10 ATHEROSCLEROSIS OF CORONARY ARTERY: Status: ACTIVE | Noted: 2022-01-01

## 2022-07-01 PROBLEM — E78.5 HYPERLIPIDEMIA LDL GOAL <70: Status: ACTIVE | Noted: 2022-01-01

## 2022-07-01 PROBLEM — I25.118 CORONARY ARTERY DISEASE OF NATIVE ARTERY OF NATIVE HEART WITH STABLE ANGINA PECTORIS (HCC): Status: ACTIVE | Noted: 2022-01-01

## 2022-07-01 PROBLEM — Z98.61 CAD S/P PERCUTANEOUS CORONARY ANGIOPLASTY: Status: ACTIVE | Noted: 2022-01-01

## 2022-07-01 PROBLEM — M19.90 OSTEOARTHRITIS: Status: ACTIVE | Noted: 2022-01-01

## 2022-07-01 PROBLEM — I42.0 ISCHEMIC DILATED CARDIOMYOPATHY (HCC): Status: ACTIVE | Noted: 2022-01-01

## 2022-07-01 PROBLEM — I25.5 ISCHEMIC DILATED CARDIOMYOPATHY: Status: ACTIVE | Noted: 2022-01-01

## 2022-07-01 NOTE — PROGRESS NOTES
[facility]  Cardiology progress Note    Patient Name: Rochelle Serrano  : 1945    CHIEF COMPLAINT  Coronary artery disease, pericardial effusion      Subjective   Subjective     HISTORY OF PRESENT ILLNESS    Rochelle Serrano is a 77 y.o. female with history of coronary disease s/p CABG and occluded grafts per recent cardiac catheterization.  She also has pericardial effusion s/p pericardiocentesis.  She has a residual large posterior pericardial effusion which is loculated.            Personal History     Social History:  reports that she has quit smoking. Her smoking use included cigarettes. She has never used smokeless tobacco. She reports that she does not drink alcohol and does not use drugs.    Home Medications:  Current Outpatient Medications on File Prior to Visit   Medication Sig   • albuterol (PROVENTIL) (5 MG/ML) 0.5% nebulizer solution Take 2.5 mg by nebulization Every 6 (Six) Hours As Needed.   • albuterol sulfate  (90 Base) MCG/ACT inhaler Inhale 1 puff Every 6 (Six) Hours As Needed.   • carvedilol (COREG) 12.5 MG tablet TAKE ONE TABLET BY MOUTH TWO TIMES A DAY   • clopidogrel (PLAVIX) 75 MG tablet Take 1 tablet by mouth Daily.   • furosemide (LASIX) 20 MG tablet TAKE 1 AND 1/2 TABLETS BY MOUTH EVERY DAY   • ipratropium-albuterol (DUO-NEB) 0.5-2.5 mg/3 ml nebulizer 3 mL 4 (Four) Times a Day.   • lisinopril (PRINIVIL,ZESTRIL) 20 MG tablet Take 1 tablet by mouth Daily.   • potassium chloride (K-DUR,KLOR-CON) 20 MEQ CR tablet TAKE ONE TABLET BY MOUTH EVERY DAY     No current facility-administered medications on file prior to visit.     Allergies:  Allergies   Allergen Reactions   • Acetaminophen Swelling   • Influenza Virus Vaccine Confusion   • Sulfa Antibiotics Hives   • Codeine Rash   • Erythromycin Rash   • Penicillins Rash       Objective    Objective       Vitals:      Body mass index is 34.94 kg/m².     PHYSICAL EXAM:    General Appearance:   · well developed  · well  nourished  HENT:   · oropharynx moist  · lips not cyanotic  Neck:  · thyroid not enlarged  · supple  Respiratory:  · no respiratory distress  · normal breath sounds  · no rales  Cardiovascular:  · no jugular venous distention  · regular rhythm  · apical impulse normal  · S1 normal, S2 normal  · no S3, no S4   · no murmur  · no rub, no thrill  · carotid pulses normal; no bruit  · pedal pulses normal  · lower extremity edema: none    Skin:   · warm, dry  Psychiatric:  · judgement and insight appropriate  · normal mood and affect      Result Review    Result Review:  I have personally reviewed the available results from  [x]  Laboratory  [x]  EKG  [x]  Cardiology  [x]  Medications  [x]  Old records  []  Other:   Procedures  Results for orders placed in visit on 06/01/22    Adult Transthoracic Echo Complete W/ Cont if Necessary Per Protocol    Interpretation Summary  Fibrocalcific mitral and aortic valves.  Mild diffuse hypokinesis of the left ventricle with mildly reduced left ventricular systolic function ejection fraction 40 to 45%.  Moderately large pericardial effusion.  No change from previous echo.  No evidence of tamponade.  Mild mitral regurgitation.  Trace tricuspid regurgitation     Impression/Plan:  1.  Coronary artery s/p CABG occluded grafts: Will need PCI of the native left circumflex artery.  Patient is keen to have PCI.  All risk benefits discussed with the patient.  We will schedule with Dr. Christian.  Continue Plavix 75 mg a day.  2.  Pericardial effusion s/p pericardiocentesis with residual posterior pericardial effusion: Discussed with the patient again about pericardial window.  Not keen at the present time will think about it.  3.  Chronic diastolic systolic heart failure: Continue Lasix 20 mg a day.  Continue carvedilol 12.5 mg twice daily.  4.  Essential hypertension controlled: Continue lisinopril 20 mg a day.  5.  Hyperlipidemia: Restart Lipitor 20 mg once a day.           Robin Bauer MD    07/05/22   18:42 EDT

## 2022-07-05 NOTE — OUTREACH NOTE
Medical Week 2 Survey    Flowsheet Row Responses   [facility] facility patient discharged from? [facility]   Does the patient have one of the following disease processes/diagnoses(primary or secondary)? Other   Week 2 attempt successful? No   Unsuccessful attempts Attempt 1          ANNE CHINCHILLA - Registered Nurse

## 2022-07-07 NOTE — OUTREACH NOTE
Medical Week 2 Survey    Flowsheet Row Responses   [facility] patient discharged from? [facility]   Does the patient have one of the following disease processes/diagnoses(primary or secondary)? Other   Week 2 attempt successful? Yes   Call start time 1553   Discharge diagnosis Pericardial effusion    Call end time 1555   Meds reviewed with patient/caregiver? Yes   Is the patient having any side effects they believe may be caused by any medication additions or changes? No   Is the patient taking all medications as directed (includes completed medication regime)? Yes   Does the patient have a primary care provider?  Yes   Comments regarding PCP 7/13/22   Has the patient kept scheduled appointments due by today? Yes   Has home health visited the patient within 72 hours of discharge? N/A   Psychosocial issues? No   What is the patient's perception of their health status since discharge? Improving   Is the patient/caregiver able to teach back the hierarchy of who to call/visit for symptoms/problems? PCP, Specialist, Home health nurse, Urgent Care, ED, 911 Yes   Week 2 Call Completed? Yes          MARY CONROY - Registered Nurse

## 2022-07-11 PROBLEM — Z95.5 S/P CORONARY ARTERY STENT PLACEMENT: Status: ACTIVE | Noted: 2022-01-01

## 2022-07-11 NOTE — PLAN OF CARE
Goal Outcome Evaluation:         Patient arrived from cath lab this afternoon with a right radial TR band. Site is clean and dry. Started removing air at 1730. VSS. Patient has had no complaints.

## 2022-07-11 NOTE — H&P
[facility]   HISTORY AND PHYSICAL    Patient Name: Rochelle Serrano  : 1945  MRN: [id number]  Primary Care Physician:  Latrell Miguel MD  Date of admission: 2022    Subjective   Subjective     Chief Complaint: shortness of breath    HPI:  Rochelle Serrano is a 77 y.o. female who presents for outpatient staged PCI today.  She underwent a recent diagnostic LHC, which revealed multiple areas of in-stent restenosis in the circumflex artery, as well as a chronic total occlusion of the mid RCA with left-to-right collaterals.  She has a known ischemic cardiomyopathy with an estimated ejection fraction of 35-40% per her latest evaluation.  Ms. Serrano continues to have severe exertional dyspnea with even minimal activity, but she admits to complete noncompliance with BiPAP therapy, as recently recommended for her GURINDER/OHS.     Review of Systems   Constitutional: Positive for fatigue. Negative for chills and fever.   HENT: Negative for hearing loss, sore throat and trouble swallowing.    Eyes: Negative for pain and visual disturbance.   Respiratory: Positive for apnea and shortness of breath. Negative for chest tightness and wheezing.    Cardiovascular: Negative for chest pain and palpitations.   Gastrointestinal: Negative for abdominal distention, abdominal pain, blood in stool and vomiting.   Endocrine: Negative for cold intolerance and heat intolerance.   Genitourinary: Negative for dysuria and hematuria.   Musculoskeletal: Positive for arthralgias and back pain. Negative for joint swelling and myalgias.   Skin: Negative for color change, pallor and rash.   Neurological: Negative for tremors, syncope, speech difficulty, light-headedness and headaches.   Hematological: Negative for adenopathy. Does not bruise/bleed easily.        Personal History     Past Medical History:   Diagnosis Date   • Arm paresthesia, left    • Arthritis    • Asthma    • CAD s/p CABG 2022   • Carpal tunnel syndrome of  left wrist    • Cataract    • Cervical radiculopathy 09/21/2017   • Diabetes ([facility])    • DJD (degenerative joint disease)    • DVT (deep venous thrombosis) ([facility])    • Heart attack ([facility])    • Hyperlipemia    • Hyperlipidemia LDL goal <70 07/01/2022   • Hypertension    • Ischemic dilated cardiomyopathy 7/1/2022   • Kidney stone    • Left leg pain 03/06/2017   • Limb swelling    • Lumbago 03/06/2017   • MI (myocardial infarction) ([facility])    • Osteoarthritis    • Paresthesia 03/06/2017    LEFT HAND/FOOT   • Peptic ulcer    • Sciatic leg pain 03/06/2017    LEFT    • Shortness of breath        Past Surgical History:   Procedure Laterality Date   • APPENDECTOMY     • CARPAL TUNNEL RELEASE  01/27/2017    LEFT    • CERVICAL FUSION     • COLONOSCOPY     • COLONOSCOPY W/ BIOPSIES AND POLYPECTOMY     • EYE SURGERY      CATARACT SURGERY   • HYSTERECTOMY     • ROTATOR CUFF REPAIR     • THYROIDECTOMY     • UPPER GASTROINTESTINAL ENDOSCOPY     • URETERAL STENT INSERTION     • URETEROLITHOTOMY         Family History: family history includes Cancer in her daughter; Diabetes in an other family member; Heart attack in her brother, daughter, and father; Heart disease in her daughter and father. Otherwise pertinent FHx was reviewed and not pertinent to current issue.    Social History:  reports that she has quit smoking. Her smoking use included cigarettes. She has never used smokeless tobacco. She reports that she does not drink alcohol and does not use drugs.    Home Medications:  albuterol, albuterol sulfate HFA, atorvastatin, carvedilol, clopidogrel, furosemide, ipratropium-albuterol, lisinopril, and potassium chloride    Allergies:  Allergies   Allergen Reactions   • Acetaminophen Swelling   • Influenza Virus Vaccine Confusion   • Sulfa Antibiotics Hives   • Codeine Rash   • Erythromycin Rash   • Penicillins Rash       Objective    Objective     Vitals:   Reviewed    Physical Exam  Constitutional:       General: She is not in acute  distress.     Appearance: Normal appearance.   HENT:      Head: Atraumatic.      Mouth/Throat:      Mouth: Mucous membranes are moist.      Pharynx: Oropharynx is clear. No oropharyngeal exudate.   Eyes:      General: No scleral icterus.     Conjunctiva/sclera: Conjunctivae normal.   Neck:      Vascular: No carotid bruit or JVD.   Cardiovascular:      Rate and Rhythm: Normal rate and regular rhythm.  No extrasystoles are present.     Chest Wall: PMI is not displaced.      Pulses:           Radial pulses are 2+ on the right side and 2+ on the left side.      Heart sounds: S1 normal and S2 normal. No murmur heard.    No friction rub. No gallop. No S3 sounds.      Comments: Trace bilateral lower extremity edema.  Pulmonary:      Effort: Pulmonary effort is normal. Prolonged expiration present. No tachypnea or respiratory distress.      Breath sounds: Examination of the left-lower field reveals decreased breath sounds. Decreased breath sounds present. No wheezing, rhonchi or rales.   Chest:      Chest wall: No tenderness.   Abdominal:      General: Bowel sounds are normal. There is no distension.      Palpations: Abdomen is soft. There is no mass.      Tenderness: There is no abdominal tenderness.      Comments: Obese.   Musculoskeletal:         General: No swelling, tenderness or deformity.      Cervical back: Neck supple. No tenderness.   Skin:     General: Skin is warm and dry.      Coloration: Skin is not jaundiced.      Findings: No erythema or rash.      Nails: There is no clubbing.   Neurological:      General: No focal deficit present.      Mental Status: She is alert and oriented to person, place, and time.      Motor: No weakness.   Psychiatric:         Mood and Affect: Mood normal.         Behavior: Behavior normal.       Result Review    Result Review:  I have personally reviewed the results from the time of this admission to 7/11/2022 09:51 EDT and agree with these findings:  [x]  Laboratory  []   Microbiology  []  Radiology  [x]  EKG/Telemetry   [x]  Cardiology/Vascular   []  Pathology  [x]  Old records  []  Other:  Most notable findings include: Hgb = 13.5, platelets = 229, Cr = 1.03, K = 4.4, glucose = 195      Assessment & Plan   Assessment / Plan     Brief Patient Summary:  Rochelle Serrano is a 77 y.o. female with:  -Coronary artery disease, s/p multiple MIs with known severe in-stent restenosis in the circumflex per recent [facility]  -Ischemic cardiomyopathy, EF is approximately 35-40% with inferior akinesis  -Chronic large pericardial effusion, primarily posterior  -Type II diabetes mellitus  -Hyperlipidemia with intolerance to numerous statins  -Left lower lobe compression atelectasis  -History of COPD/asthma  -GURINDER, BiPAP therapy has been recommended, but she is noncompliant  -Obesity    Active Hospital Problems:  Active Hospital Problems    Diagnosis    • **Coronary artery disease of native artery of native heart with stable angina pectoris (HCC)      Added automatically from request for surgery [id number]         Plan:   -Will proceed with staged PCI to her known circumflex system disease today.  The risks (death, heart attack, stroke, loss of limb, infection, arterial dissection, coronary artery perforation and potential pericardial tamponade, severe bleeding and potential need for blood transfusion, renal failure and potential need for permanent dialysis, anaphylaxis) and benefits of the procedure were reviewed in detail with Ms. Serrano.  She voiced understanding and wishes to proceed today.  Will plan for a right radial approach.  Continue current medical therapy pending results of the procedure.      DVT prophylaxis:  No DVT prophylaxis order currently exists.    CODE STATUS:    Full code       Electronically signed by Harinder Christian MD, 07/11/22, 9:51 AM EDT.

## 2022-07-11 NOTE — PRE-SEDATION DOCUMENTATION
Sedation Plan    ASA 3     Mallampati class: III.    Risks, benefits, and alternatives discussed with patient.    Harinder Christian MD  7/11/2022

## 2022-07-12 NOTE — SIGNIFICANT NOTE
07/12/22 1245   Coping/Psychosocial   Observed Emotional State calm;cooperative   Verbalized Emotional State hopefulness   Trust Relationship/Rapport empathic listening provided   Involvement in Care interacting with patient   Additional Documentation Spiritual Care (Group)   Spiritual Care   Use of Spiritual Resources spirituality for coping, indicated strong use of   Spiritual Care Source  initiative   Spiritual Care Follow-Up follow-up, none required as presently assessed   Response to Spiritual Care engaged in conversation;receptive of support   Spiritual Care Interventions supportive conversation provided   Spiritual Care Visit Type initial   Receptivity to Spiritual Care visit welcomed

## 2022-07-12 NOTE — PROGRESS NOTES
[facility]     Progress Note    Patient Name: Rochelle Serrano  : 1945  MRN: [id number]  Primary Care Physician:  Latrell Miguel MD  Date of admission: 2022    Subjective   Subjective     HPI:  Ms. Serrano reports she is feeling well this morning and is anxious to go home.  No episodes of chest pain/pressure, dyspnea, palpitations, nausea, or lightheadedness reported.  Her telemetry monitor shows sinus rhythm with a heart rate in the 90s and occasional PVCs, but no evidence of arrhythmias.    Review of Systems  Negative except as per HPI    Objective   Objective     Vitals:   Temp:  [97.5 °F (36.4 °C)-99.1 °F (37.3 °C)] 98.6 °F (37 °C)  Heart Rate:  [55-91] 91  Resp:  [18-24] 18  BP: (106-172)/() 141/80  Flow (L/min):  [2-3] 3    Physical Exam  General: alert and oriented, no distress  Neck: supple, no JVD, no bruit  Chest: equal air entry bilaterally, clear to auscultation, +prolonged expiratory phase, no tachypnea, no wheezing or rales  CV: regular rate and rhythm, S1 and S2 present, no S3 gallop, no friction rub  Abdomen: soft, obese, non-distended, non-tender, + bowel sounds, no masses  Extremities: no cyanosis, trace bilateral lower extremity edema  Neuro: normal speech, no focal deficits    Current Medications:   •  aspirin  •  clopidogrel  •  ondansetron **OR** ondansetron     Result Review    Result Review:  I have personally reviewed the results from the time of this admission to 2022 10:32 EDT and agree with these findings:  [x]  Laboratory  []  Microbiology  []  Radiology  [x]  EKG/Telemetry   [x]  Cardiology/Vascular   []  Pathology  []  Old records  []  Other:  Most notable findings include: Cr = 0.87, K = 4.5, Na = 141, glucose = 163, Hgb = 13.9, platelets = 228      Assessment & Plan   Assessment / Plan     Brief Patient Summary:  Rochelle Serrano is a 77 y.o. female with:  -Coronary artery disease, s/p successful PCI to the in-stent restenosis of the circumflex  yesterday  -Ischemic cardiomyopathy, EF is approximately 35-40% with inferior akinesis and known chronic total occlusion of the mid RCA (with left-to-right collaterals)  -Chronic large pericardial effusion, primarily posterior  -Type II diabetes mellitus  -Hyperlipidemia, LDL goal <70  -Left lower lobe compression atelectasis  -History of COPD/asthma  -GURINDER, BiPAP therapy has been recommended, but she is noncompliant  -Obesity    Active Hospital Problems:  Active Hospital Problems    Diagnosis    • **Coronary artery disease of native artery of native heart with stable angina pectoris (HCC)      Added automatically from request for surgery [id number]     • S/P coronary artery stent placement        Plan:   -No evidence of PCI complications and she is doing well; will d/c home.  She should follow up in the office with Dr. Bauer (her usual cardiologist) within 2-3 weeks.  -Continue DAPT with aspirin and clopidogrel for a minimum of 6 months.  Continue high-intensity statin, carvedilol, lisinopril, and furosemide at her chronic outpatient doses.  -Will place referral for outpatient cardiac rehab  -I again strongly recommended compliance with BiPAP therapy, both while sleeping at night and during all naps.       DVT prophylaxis:  No DVT prophylaxis order currently exists.    CODE STATUS:    Level Of Support Discussed With: Patient  Code Status (Patient has no pulse and is not breathing): CPR (Attempt to Resuscitate)  Medical Interventions (Patient has pulse or is breathing): Full Support      Electronically signed by Harinder Christian MD, 07/12/22, 10:32 AM EDT.

## 2022-07-12 NOTE — PLAN OF CARE
Problem: Adult Inpatient Plan of Care  Goal: Plan of Care Review  Outcome: Ongoing, Progressing  Goal: Patient-Specific Goal (Individualized)  Outcome: Ongoing, Progressing  Goal: Absence of Hospital-Acquired Illness or Injury  Outcome: Ongoing, Progressing  Intervention: Identify and Manage Fall Risk  Intervention: Prevent Skin Injury  Intervention: Prevent and Manage VTE (Venous Thromboembolism) Risk  Goal: Optimal Comfort and Wellbeing  Outcome: Ongoing, Progressing  Goal: Readiness for Transition of Care  Outcome: Ongoing, Progressing     Problem: Asthma Comorbidity  Goal: Maintenance of Asthma Control  Outcome: Ongoing, Progressing     Problem: Diabetes Comorbidity  Goal: Blood Glucose Level Within Targeted Range  Outcome: Ongoing, Progressing     Problem: Hypertension Comorbidity  Goal: Blood Pressure in Desired Range  Outcome: Ongoing, Progressing     Problem: Hypertension Comorbidity  Goal: Blood Pressure in Desired Range  Outcome: Ongoing, Progressing     Problem: Skin Injury Risk Increased  Goal: Skin Health and Integrity  Outcome: Ongoing, Progressing   Goal Outcome Evaluation:

## 2022-07-12 NOTE — PLAN OF CARE
Goal Outcome Evaluation:   Removed TR band at 1930. No complaints of cp. Patient was up to BSC X1 assists several times throughout the night. Janet maldonado

## 2022-07-24 NOTE — ED PROVIDER NOTES
Time: 1:32 PM EDT  Arrived by: private car  Chief Complaint: SOB  History provided by: Pt  History is limited by: N/A     History of Present Illness:  Patient is a 77 y.o.  female that presents to the emergency department with SOB with onset this morning around 3-4 hours ago. Pt was not exerting herself when symptoms started. Pt had a recent heart cath done on 7/11/22. Pt's SaO2 was 74% on RA. Pt is usually at 2L O2 at home. Pt denies any fever, chest pain, nausea, vomiting or diarrhea today. Pt denies coughing worse than normal. Pt states that she also had pain in her left abdomen for over a year.    HPI    Patient Care Team  Primary Care Provider: Latrell Miguel MD    Past Medical History:     Allergies   Allergen Reactions   • Acetaminophen Swelling   • Influenza Virus Vaccine Confusion   • Sulfa Antibiotics Hives   • Codeine Rash   • Erythromycin Rash   • Penicillins Rash     Past Medical History:   Diagnosis Date   • Arm paresthesia, left    • Arthritis    • Asthma    • CAD s/p CABG 07/01/2022   • Carpal tunnel syndrome of left wrist    • Cataract    • Cervical radiculopathy 09/21/2017   • Diabetes (Formerly [facility])    • DJD (degenerative joint disease)    • DVT (deep venous thrombosis) (Formerly [facility])    • Heart attack (Formerly [facility])    • Hyperlipemia    • Hyperlipidemia LDL goal <70 07/01/2022   • Hypertension    • Ischemic dilated cardiomyopathy 7/1/2022   • Kidney stone    • Left leg pain 03/06/2017   • Limb swelling    • Lumbago 03/06/2017   • MI (myocardial infarction) (Formerly [facility])    • Osteoarthritis    • Paresthesia 03/06/2017    LEFT HAND/FOOT   • Peptic ulcer    • Sciatic leg pain 03/06/2017    LEFT    • Shortness of breath      Past Surgical History:   Procedure Laterality Date   • APPENDECTOMY     • CARDIAC CATHETERIZATION N/A 6/20/2022    Procedure: Pericardiocentesis;  Surgeon: Harinder Christian MD;  Location: [facility] CATH INVASIVE LOCATION;  Service: Cardiovascular;  Laterality: N/A;   • CARDIAC CATHETERIZATION N/A 6/20/2022     Procedure: Left Heart Cath;  Surgeon: Harinder Christian MD;  Location: [facility] ED CATH INVASIVE LOCATION;  Service: Cardiovascular;  Laterality: N/A;   • CARDIAC CATHETERIZATION Right 7/11/2022    Procedure: Percutaneous Coronary Intervention;  Surgeon: Harinder Christian MD;  Location: [facility] ED CATH INVASIVE LOCATION;  Service: Cardiovascular;  Laterality: Right;   • CARPAL TUNNEL RELEASE  01/27/2017    LEFT    • CERVICAL FUSION     • COLONOSCOPY     • COLONOSCOPY W/ BIOPSIES AND POLYPECTOMY     • EYE SURGERY      CATARACT SURGERY   • HYSTERECTOMY     • ROTATOR CUFF REPAIR     • THYROIDECTOMY     • UPPER GASTROINTESTINAL ENDOSCOPY     • URETERAL STENT INSERTION     • URETEROLITHOTOMY       Family History   Problem Relation Age of Onset   • Heart disease Father    • Heart attack Father    • Heart attack Brother    • Heart disease Daughter    • Cancer Daughter    • Heart attack Daughter    • Diabetes Other    • Colon cancer Neg Hx        Home Medications:  Prior to Admission medications    Medication Sig Start Date End Date Taking? Authorizing Provider   albuterol (PROVENTIL) (5 MG/ML) 0.5% nebulizer solution Take 2.5 mg by nebulization Every 6 (Six) Hours As Needed.    Provider, MD Rakesh   albuterol sulfate  (90 Base) MCG/ACT inhaler Inhale 1 puff Every 6 (Six) Hours As Needed.    Provider, MD Rakesh   aspirin (aspirin) 81 MG EC tablet Take 1 tablet by mouth Daily. 7/12/22   Harinder Christian MD   atorvastatin (LIPITOR) 20 MG tablet Take 1 tablet by mouth Daily. 7/8/22   Robin Bauer MD   carvedilol (COREG) 12.5 MG tablet TAKE ONE TABLET BY MOUTH TWO TIMES A DAY 10/8/21   Robin Bauer MD   clopidogrel (PLAVIX) 75 MG tablet Take 1 tablet by mouth Daily. 12/21/21   Robin Bauer MD   furosemide (LASIX) 20 MG tablet TAKE 1 AND 1/2 TABLETS BY MOUTH EVERY DAY 9/8/21   Robin Bauer MD   ipratropium-albuterol (DUO-NEB) 0.5-2.5 mg/3 ml nebulizer 3 mL 4 (Four) Times a Day.  "5/11/22   Provider, MD Rakesh   lisinopril (PRINIVIL,ZESTRIL) 20 MG tablet Take 1 tablet by mouth Daily. 12/21/21   Robin Bauer MD   potassium chloride (K-DUR,KLOR-CON) 20 MEQ CR tablet TAKE ONE TABLET BY MOUTH EVERY DAY 5/18/22   Robin Bauer MD        Social History:   Social History     Tobacco Use   • Smoking status: Former Smoker     Types: Cigarettes   • Smokeless tobacco: Never Used   Vaping Use   • Vaping Use: Never used   Substance Use Topics   • Alcohol use: Never   • Drug use: Never       Review of Systems:  Review of Systems   Constitutional: Negative for chills and fever.   HENT: Negative for congestion, rhinorrhea and sore throat.    Eyes: Negative for pain and visual disturbance.   Respiratory: Positive for shortness of breath. Negative for apnea, cough and chest tightness.    Cardiovascular: Negative for chest pain and palpitations.   Gastrointestinal: Positive for abdominal pain. Negative for diarrhea, nausea and vomiting.   Genitourinary: Negative for difficulty urinating and dysuria.   Musculoskeletal: Negative for joint swelling and myalgias.   Skin: Negative for color change.   Neurological: Negative for seizures and headaches.   Psychiatric/Behavioral: Negative.    All other systems reviewed and are negative.         Physical Exam:  /73   Pulse 91   Temp 98 °F (36.7 °C) (Oral)   Resp (!) 34   Ht 151.1 cm (59.5\")   SpO2 95%   BMI 34.37 kg/m²     Physical Exam  Vitals and nursing note reviewed.   Constitutional:       Appearance: Normal appearance.   HENT:      Head: Normocephalic and atraumatic.      Nose: Nose normal.      Mouth/Throat:      Mouth: Mucous membranes are dry.   Eyes:      Extraocular Movements: Extraocular movements intact.      Pupils: Pupils are equal, round, and reactive to light.   Cardiovascular:      Rate and Rhythm: Normal rate and regular rhythm.      Heart sounds: Normal heart sounds.   Pulmonary:      Effort: Respiratory distress (mild) " present.      Breath sounds: Decreased breath sounds (bilaterally) present.   Abdominal:      General: Bowel sounds are normal.      Palpations: Abdomen is soft.      Tenderness: There is no abdominal tenderness.   Musculoskeletal:         General: No swelling. Normal range of motion.      Cervical back: Normal range of motion and neck supple.      Comments: mild pitting edema of ankles bilaterally     Skin:     General: Skin is warm and dry.      Coloration: Skin is not jaundiced.   Neurological:      General: No focal deficit present.      Mental Status: She is alert and oriented to person, place, and time. Mental status is at baseline.   Psychiatric:         Mood and Affect: Mood normal.         Behavior: Behavior normal.         Judgment: Judgment normal.                Medications in the Emergency Department:  Medications   sodium chloride 0.9 % flush 10 mL (has no administration in time range)   sodium chloride 0.9 % flush 10 mL (has no administration in time range)   furosemide (LASIX) injection 20 mg (20 mg Intravenous Given 7/24/22 1721)        Labs  Lab Results (last 24 hours)     Procedure Component Value Units Date/Time    CBC & Differential [[id number]]  (Abnormal) Collected: 07/24/22 1343    Specimen: Blood Updated: 07/24/22 1355    Narrative:      The following orders were created for panel order CBC & Differential.  Procedure                               Abnormality         Status                     ---------                               -----------         ------                     CBC Auto Differential[[id number]]        Abnormal            Final result                 Please view results for these tests on the individual orders.    Lactic Acid, Plasma [[id number]]  (Normal) Collected: 07/24/22 1343    Specimen: Blood Updated: 07/24/22 1413     Lactate 1.2 mmol/L     Blood Culture - Blood, Arm, Left [[id number]] Collected: 07/24/22 1343    Specimen: Blood from Arm, Left Updated: 07/24/22 3651     Blood Culture - Blood, Arm, Left [[id number]] Collected: 07/24/22 1343    Specimen: Blood from Arm, Left Updated: 07/24/22 1351    CBC Auto Differential [[id number]]  (Abnormal) Collected: 07/24/22 1343    Specimen: Blood Updated: 07/24/22 1355     WBC 9.73 10*3/mm3      RBC 4.72 10*6/mm3      Hemoglobin 14.3 g/dL      Hematocrit 43.9 %      MCV 93.0 fL      MCH 30.3 pg      MCHC 32.6 g/dL      RDW 14.1 %      RDW-SD 48.0 fl      MPV 10.7 fL      Platelets 220 10*3/mm3      Neutrophil % 75.5 %      Lymphocyte % 14.6 %      Monocyte % 7.8 %      Eosinophil % 1.4 %      Basophil % 0.4 %      Immature Grans % 0.3 %      Neutrophils, Absolute 7.34 10*3/mm3      Lymphocytes, Absolute 1.42 10*3/mm3      Monocytes, Absolute 0.76 10*3/mm3      Eosinophils, Absolute 0.14 10*3/mm3      Basophils, Absolute 0.04 10*3/mm3      Immature Grans, Absolute 0.03 10*3/mm3      nRBC 0.0 /100 WBC     Comprehensive Metabolic Panel [[id number]]  (Abnormal) Collected: 07/24/22 1344    Specimen: Blood Updated: 07/24/22 1415     Glucose 239 mg/dL      BUN 25 mg/dL      Creatinine 1.02 mg/dL      Sodium 141 mmol/L      Potassium 4.4 mmol/L      Chloride 102 mmol/L      CO2 26.7 mmol/L      Calcium 9.5 mg/dL      Total Protein 7.2 g/dL      Albumin 4.00 g/dL      ALT (SGPT) 22 U/L      AST (SGOT) 18 U/L      Alkaline Phosphatase 89 U/L      Total Bilirubin 0.7 mg/dL      Globulin 3.2 gm/dL      A/G Ratio 1.3 g/dL      BUN/Creatinine Ratio 24.5     Anion Gap 12.3 mmol/L      eGFR 56.4 mL/min/1.73      Comment: National Kidney Foundation and American Society of Nephrology (ASN) Task Force recommended calculation based on the Chronic Kidney Disease Epidemiology Collaboration (CKD-EPI) equation refit without adjustment for race.       Narrative:      GFR Normal >60  Chronic Kidney Disease <60  Kidney Failure <15      BNP [[id number]]  (Abnormal) Collected: 07/24/22 1344    Specimen: Blood Updated: 07/24/22 1412     proBNP 7,163.0 pg/mL     Narrative:    "   Among patients with dyspnea, NT-proBNP is highly sensitive for the detection of acute congestive heart failure. In addition NT-proBNP of <300 pg/ml effectively rules out acute congestive heart failure with 99% negative predictive value.    Results may be falsely decreased if patient taking Biotin.      Troponin [[id number]]  (Abnormal) Collected: 07/24/22 1344    Specimen: Blood Updated: 07/24/22 1427     Troponin T 0.043 ng/mL     Narrative:      Troponin T Reference Range:  <= 0.03 ng/mL-   Negative for AMI  >0.03 ng/mL-     Abnormal for myocardial necrosis.  Clinicians would have to utilize clinical acumen, EKG, Troponin and serial changes to determine if it is an Acute Myocardial Infarction or myocardial injury due to an underlying chronic condition.       Results may be falsely decreased if patient taking Biotin.      Procalcitonin [[id number]]  (Normal) Collected: 07/24/22 1344    Specimen: Blood Updated: 07/24/22 1421     Procalcitonin 0.05 ng/mL     Narrative:      As a Marker for Sepsis (Non-Neonates):    1. <0.5 ng/mL represents a low risk of severe sepsis and/or septic shock.  2. >2 ng/mL represents a high risk of severe sepsis and/or septic shock.    As a Marker for Lower Respiratory Tract Infections that require antibiotic therapy:    PCT on Admission    Antibiotic Therapy       6-12 Hrs later    >0.5                Strongly Recommended  >0.25 - <0.5        Recommended  0.1 - 0.25          Discouraged              Remeasure/reassess PCT  <0.1                Strongly Discouraged     Remeasure/reassess PCT    As 28 day mortality risk marker: \"Change in Procalcitonin Result\" (>80% or <=80%) if Day 0 (or Day 1) and Day 4 values are available. Refer to http://www.DXYs-pct-calculator.com    Change in PCT <=80%  A decrease of PCT levels below or equal to 80% defines a positive change in PCT test result representing a higher risk for 28-day all-cause mortality of patients diagnosed with severe sepsis for " septic shock.    Change in PCT >80%  A decrease of PCT levels of more than 80% defines a negative change in PCT result representing a lower risk for 28-day all-cause mortality of patients diagnosed with severe sepsis or septic shock.    This test is Prognostic not Diagnostic, if elevated correlate with clinical findings before administering antibiotic treatment.        COVID-19,APTIMA PANTHER(FERMÍN),[facility], NP/OP SWAB IN UTM/VTM/SALINE TRANSPORT MEDIA,24 HR TAT - Swab, Nasal Cavity [[id number]] Collected: 07/24/22 1352    Specimen: Swab from Nasal Cavity Updated: 07/24/22 1358    Influenza Antigen, Rapid - Swab, Nasopharynx [[id number]]  (Normal) Collected: 07/24/22 1352    Specimen: Swab from Nasopharynx Updated: 07/24/22 1432     Influenza A Ag, EIA Negative     Influenza B Ag, EIA Negative    ABG with Co-Ox and Electrolytes [[id number]]  (Abnormal) Collected: 07/24/22 1411    Specimen: Arterial Blood from Arm, Right Updated: 07/24/22 1417     pH, Arterial 7.365 pH units      pCO2, Arterial 52.8 mm Hg      pO2, Arterial 92.5 mm Hg      HCO3, Arterial 29.5 mmol/L      Base Excess, Arterial 3.0 mmol/L      O2 Saturation, Arterial 96.4 %      Hemoglobin, Blood Gas 14.3 g/dL      Carboxyhemoglobin 0.8 %      Methemoglobin 0.10 %      Oxyhemoglobin 95.5 %      FHHB 3.6 %      Jimmy's Test N/A     Note --     Site Arterial: right brachial     Modality Nasal Cannula     FIO2 36 %      Flow Rate 4 lpm      Sodium, Arterial 138.8 mmol/L      Potassium, Arterial 4.20 mmol/L      Ionized Calcium, Arterial 1.12 mmol/L      Chloride, Arterial 103 mmol/L      Glucose, Arterial 217 mmol/L      Lactate, Arterial 1.41 mmol/L      PO2/FIO2 257    Troponin [[id number]]  (Abnormal) Collected: 07/24/22 1616    Specimen: Blood Updated: 07/24/22 1724     Troponin T 0.042 ng/mL     Narrative:      Troponin T Reference Range:  <= 0.03 ng/mL-   Negative for AMI  >0.03 ng/mL-     Abnormal for myocardial necrosis.  Clinicians would have to  utilize clinical acumen, EKG, Troponin and serial changes to determine if it is an Acute Myocardial Infarction or myocardial injury due to an underlying chronic condition.       Results may be falsely decreased if patient taking Biotin.             Imaging:  XR Chest 1 View    Result Date: 7/24/2022  PROCEDURE: XR CHEST 1 VW  COMPARISON: [facility], CR, XR CHEST 1 VW, 6/22/2022, 6:59.  [facility], CT, CT ANGIOGRAM CHEST W WO CONTRAST, 6/20/2022, 14:33.  INDICATIONS: SOA Triage Protocol  FINDINGS:   The lungs are well-expanded. The heart and pulmonary vasculature are within normal limits. No pleural effusions are identified. There are no active appearing infiltrates.  Stable large pericardial effusion.  IMPRESSION:  Stable large pericardial effusion.  KAMLA CLIFTON MD       Electronically Signed and Approved By: KAMLA CLIFTON MD on 7/24/2022 at 13:28                EKG:      Procedures:  Procedures    Progress  ED Course as of 07/24/22 1751   Sun Jul 24, 2022   1331 EKG interpretation: Normal sinus rhythm, heart rate 104, normal SC and QT intervals, normal QRS duration, normal axis, nonspecific interventricular conduction delay, nonspecific ST segment changes with no acute ischemia.  Appearance is similar to EKG dated 7/11/2022 [RP]   1340 Patient hypoxic on presentation but did not wear her oxygen in route to the emergency department.  States she wears 2 L nasal cannula continuously at home.  O2 sats in the mid 90s on 3 L nasal cannula on my evaluation. [RP]   1748 Reevaluation: Patient again with sats in the mid 90s on her home oxygen setting.  Offered admission to discuss her pericardial window that has been recommended in the past.  She voices again that she does not wish to have this procedure at this time and does not want to be admitted. [RP]      ED Course User Index  [RP] Westley Vazquez MD                            Medical Decision Making:  MDM  Number of Diagnoses or  Management Options  Shortness of breath: established and worsening     Amount and/or Complexity of Data Reviewed  Clinical lab tests: reviewed  Tests in the radiology section of CPT®: reviewed  Tests in the medicine section of CPT®: reviewed  Decide to obtain previous medical records or to obtain history from someone other than the patient: yes  Review and summarize past medical records: yes  Independent visualization of images, tracings, or specimens: yes    Risk of Complications, Morbidity, and/or Mortality  Presenting problems: moderate  Management options: low    Patient Progress  Patient progress: stable       Final diagnoses:   Shortness of breath   Pericardial effusion        Disposition:  ED Disposition     ED Disposition   Discharge    Condition   Stable    Comment   --                Jovany Chen  07/24/22 [id number]       Westley Vazquez MD  07/24/22 0942

## 2022-07-24 NOTE — DISCHARGE INSTRUCTIONS
Return to the emergency department immediately for worsening difficulty breathing, chest pain, fever.  Follow-up your cardiologist tomorrow.

## 2022-08-02 NOTE — TELEPHONE ENCOUNTER
Dr Bauer was going to have Dr Christian perform this procedure. Please get with Dr Christian and patient on a date that this can be performed  
Received VM from patient    SW patient. Patient states Dr Bauer told her to call when she was ready to have pericardial window. Patient states she is now agreeable to have pericardial window.     Please advise  
SW Dr Crhistian. Per Dr Christian does not perform the  procedures. He recommends referral to Dr Tolliver.   
Declined

## 2022-08-11 NOTE — ED PROVIDER NOTES
Time: 11:48 PM EDT  Arrived by: ambulance  Chief Complaint: Cardiac arrest  History provided by: EMS / Family  History is limited by: Pt is unresponsive        History of Present Illness:  Patient is a 77 y.o. year old female who presents to the emergency department with  A chief complaint of Cardiac arrest. Pt is still receiving active compressions upon arrival to ER. Pt was intubated upon arrival to ER. EMS did 35 minutes of CPR. No bystander CPR administered prior. Pt family states pt was found unresponsive on the ground after she went to do a breathing treatment. Family states pt recently had fluid drained from her heart, and she was on a breathing machine. Pt has a past medical history of coronary artery disease, status post CABG, heart failure, and pericardial effusion.                  Similar Symptoms Previously: Yes  Recently seen: Yes (7/24/22) For Shortness of breath      Patient Care Team  Primary Care Provider: Latrell Miguel MD    Past Medical History:     Allergies   Allergen Reactions   • Acetaminophen Swelling   • Influenza Virus Vaccine Confusion   • Sulfa Antibiotics Hives   • Codeine Rash   • Erythromycin Rash   • Penicillins Rash     Past Medical History:   Diagnosis Date   • Arm paresthesia, left    • Arthritis    • Asthma    • CAD s/p CABG 07/01/2022   • Carpal tunnel syndrome of left wrist    • Cataract    • Cervical radiculopathy 09/21/2017   • Diabetes (Formerly [facility])    • DJD (degenerative joint disease)    • DVT (deep venous thrombosis) (Formerly [facility])    • Heart attack (Formerly [facility])    • Hyperlipemia    • Hyperlipidemia LDL goal <70 07/01/2022   • Hypertension    • Ischemic dilated cardiomyopathy 7/1/2022   • Kidney stone    • Left leg pain 03/06/2017   • Limb swelling    • Lumbago 03/06/2017   • MI (myocardial infarction) (Formerly [facility])    • Osteoarthritis    • Paresthesia 03/06/2017    LEFT HAND/FOOT   • Peptic ulcer    • Sciatic leg pain 03/06/2017    LEFT    • Shortness of breath      Past Surgical History:    Procedure Laterality Date   • APPENDECTOMY     • CARDIAC CATHETERIZATION N/A 6/20/2022    Procedure: Pericardiocentesis;  Surgeon: Harinder Christian MD;  Location: [facility] CATH INVASIVE LOCATION;  Service: Cardiovascular;  Laterality: N/A;   • CARDIAC CATHETERIZATION N/A 6/20/2022    Procedure: Left Heart Cath;  Surgeon: Harinder Christian MD;  Location: [facility] CATH INVASIVE LOCATION;  Service: Cardiovascular;  Laterality: N/A;   • CARDIAC CATHETERIZATION Right 7/11/2022    Procedure: Percutaneous Coronary Intervention;  Surgeon: Harinder Christian MD;  Location: [facility] CATH INVASIVE LOCATION;  Service: Cardiovascular;  Laterality: Right;   • CARPAL TUNNEL RELEASE  01/27/2017    LEFT    • CERVICAL FUSION     • COLONOSCOPY     • COLONOSCOPY W/ BIOPSIES AND POLYPECTOMY     • EYE SURGERY      CATARACT SURGERY   • HYSTERECTOMY     • ROTATOR CUFF REPAIR     • THYROIDECTOMY     • UPPER GASTROINTESTINAL ENDOSCOPY     • URETERAL STENT INSERTION     • URETEROLITHOTOMY       Family History   Problem Relation Age of Onset   • Heart disease Father    • Heart attack Father    • Heart attack Brother    • Heart disease Daughter    • Cancer Daughter    • Heart attack Daughter    • Diabetes Other    • Colon cancer Neg Hx        Home Medications:  Prior to Admission medications    Medication Sig Start Date End Date Taking? Authorizing Provider   albuterol (PROVENTIL) (5 MG/ML) 0.5% nebulizer solution Take 2.5 mg by nebulization Every 6 (Six) Hours As Needed.    ProviderRakesh MD   albuterol sulfate  (90 Base) MCG/ACT inhaler Inhale 1 puff Every 6 (Six) Hours As Needed.    Provider, MD Rakesh   aspirin (aspirin) 81 MG EC tablet Take 1 tablet by mouth Daily. 7/12/22   Harinder Christian MD   atorvastatin (LIPITOR) 20 MG tablet Take 1 tablet by mouth Daily. 7/8/22   Robin Bauer MD   carvedilol (COREG) 12.5 MG tablet TAKE ONE TABLET BY MOUTH TWO TIMES A DAY 10/8/21   Robin Bauer MD   clopidogrel  (PLAVIX) 75 MG tablet Take 1 tablet by mouth Daily. 12/21/21   Robin Bauer MD   furosemide (LASIX) 20 MG tablet TAKE 1 AND 1/2 TABLETS BY MOUTH EVERY DAY 9/8/21   Robin Bauer MD   ipratropium-albuterol (DUO-NEB) 0.5-2.5 mg/3 ml nebulizer 3 mL 4 (Four) Times a Day. 5/11/22   ProviderRakesh MD   lisinopril (PRINIVIL,ZESTRIL) 20 MG tablet Take 1 tablet by mouth Daily. 12/21/21   Robin Bauer MD   potassium chloride (K-DUR,KLOR-CON) 20 MEQ CR tablet TAKE ONE TABLET BY MOUTH EVERY DAY 5/18/22   Robin Bauer MD        Social History:   Social History     Tobacco Use   • Smoking status: Former Smoker     Types: Cigarettes   • Smokeless tobacco: Never Used   Vaping Use   • Vaping Use: Never used   Substance Use Topics   • Alcohol use: Never   • Drug use: Never     Recent travel: no     Review of Systems:  Review of Systems   Unable to perform ROS: Patient unresponsive        Physical Exam:  Pulse 94     Physical Exam  Vitals and nursing note reviewed.   Constitutional:       Appearance: She is not toxic-appearing.      Interventions: She is intubated (Pt intubated upon arrival).   HENT:      Head: Normocephalic and atraumatic.   Eyes:      General: No scleral icterus.     Pupils:      Right eye: Pupil is not reactive.      Left eye: Pupil is not reactive.      Comments: Pupils are 5mm and fixed   Cardiovascular:      Pulses: Decreased pulses (Pt has no pulses).      Comments: Pt has no heart sounds.  Pulmonary:      Effort: Pulmonary effort is normal. No respiratory distress. She is intubated (Pt intubated upon arrival).      Breath sounds: Normal breath sounds.   Abdominal:      General: Abdomen is flat. There is distension.      Palpations: Abdomen is soft.      Tenderness: There is no abdominal tenderness.   Musculoskeletal:      Cervical back: Normal range of motion and neck supple.   Skin:     General: Skin is dry.   Neurological:      Mental Status: She is unresponsive.       Comments: Pt has a GSC score of 3.                 Medications in the Emergency Department:  Medications   EPINEPHrine (ADRENALIN) injection (1 mg Intravenous Given 8/10/22 2356)   sodium bicarbonate injection 8.4% (50 mEq Intravenous Given 8/10/22 2357)   calcium chloride injection (1 g Intravenous Given 8/10/22 2357)        Labs  Lab Results (last 24 hours)     ** No results found for the last 24 hours. **           Imaging:  No Radiology Exams Resulted Within Past 24 Hours    Procedures:  Procedures    Progress                            Medical Decision Making:  MDM  Number of Diagnoses or Management Options     Amount and/or Complexity of Data Reviewed  Review and summarize past medical records: yes (00:14 EDT Reviewed medical records. Pt has a chest x-ray from two weeks ago indicating a very large pericardial effusion.    Last month pth had cardiac stents placed at [facility].)    Patient Progress  Patient progress: (Few Rounds of CPR preformed, IV sodium bicarb, epinephrine and calcium administered.     Upon reassessment, pt remained in asystole on the monitor, and a bedside cardiac ultrasound was preformed indicating no cardiac activity.     Time of death at 12:00 am August 11th.)           This patient is a 77-year-old female with significant cardiac disease including recent cardiac stent in the hospital last month as well as a very large pericardial effusion seen on most recent chest x-ray a couple weeks ago, now presents via EMS in cardiac arrest.    She was found down at home and no bystander CPR performed on EMS arrival and EMS has already performed CPR on her for about 35 minutes prior to ED arrival.    She arrives pulseless and unresponsive with fixed pupils here.    We did a couple rounds of CPR including compressions and ventilations via her endotracheal tube as well as IV epinephrine and sodium bicarbonate and IV calcium.    We were unable to get return of spontaneous circulation, and I  discussed with her family members in the family room that I consider her out of hospital cardiac arrest and being pulseless on EMS arrival an extremely poor prognosis and low likelihood for survival.      Following 2 rounds of CPR she remained pulseless, and I did perform a bedside cardiac ultrasound, revealing no cardiac activity.    ** At this time we ceased resuscitative efforts and I called the code with time of death at midnight on 2022.        Final diagnoses:   Cardiac arrest (HCC)   Chronic obstructive pulmonary disease, unspecified COPD type (HCC)   Type 2 diabetes mellitus with hyperglycemia, unspecified whether long term insulin use (HCC)   Pericardial effusion        Disposition:  ED Disposition     ED Disposition       Condition   --    Comment   --           Documentation assistance provided by Abdullahi Diop acting as scribe for Isra Rai MD. Information recorded by the scribe was done at my direction and has been verified and validated by me.     This medical record created using voice recognition software.           Kush Diop  22 0030       Kush Diop  22 0047       Isra Rai MD  22 0226       Isra Rai MD  22 0227

## (undated) DEVICE — BRACHIAL/AXILLARY/CEREBRAL DRAPE 38 1/2" X 60": Brand: BRACHIAL/AXILLARY/CEREBRAL DRAPE

## (undated) DEVICE — ANGIOSCULPT EVO RX PTCA SCORING BALLOON CATHETER WITH HYDROPHILIC COATING, 2.5 MM X 10 MM: Brand: ANGIOSCULPT EVO

## (undated) DEVICE — CATH F6 ST PIG 155 110CM 6SH: Brand: SUPER TORQUE

## (undated) DEVICE — TOWEL,OR,DSP,ST,BLUE,STD,4/PK,20PK/CS: Brand: MEDLINE

## (undated) DEVICE — APPL CHLORAPREP HI/LITE TINTED 10.5ML ORNG

## (undated) DEVICE — DECANTER: Brand: UNBRANDED

## (undated) DEVICE — GLV SURG SENSICARE SLT PF LF 7.5 STRL

## (undated) DEVICE — CATH F6 ST JL 4 100CM: Brand: SUPERTORQUE

## (undated) DEVICE — 6F .070 XB 3 100CM: Brand: VISTA BRITE TIP

## (undated) DEVICE — CVR PROB ULTRASND GLS STRL

## (undated) DEVICE — Device

## (undated) DEVICE — DEV INFL BASIXCOMPAK W/INTRO/20G

## (undated) DEVICE — PINNACLE PRECISION ACCESS SYSTEM INTRODUCER SHEATH: Brand: PINNACLE PRECISION ACCESS SYSTEM

## (undated) DEVICE — INTRO SHEATH PRELUDE PRO MARKRTIP 6F11

## (undated) DEVICE — SHT AIR TRANSFR COMFRT GLIDE LAT 40X80IN

## (undated) DEVICE — A2000 MULTI-USE SYRINGE KIT, P/N 701277-003KIT CONTENTS: 100ML CONTRAST RESERVOIR AND TUBING WITH CONTRAST SPIKE AND CLAMP: Brand: A2000 MULTI-USE SYRINGE KIT

## (undated) DEVICE — GW FC FLOP/TP .035 260CM 3MM

## (undated) DEVICE — BLD CLIP UNIV SURG GRY

## (undated) DEVICE — KT PERICARDIOCENTESIS PIG .035IN 6F

## (undated) DEVICE — MODEL AT P65, P/N 701554-001KIT CONTENTS: HAND CONTROLLER, 3-WAY HIGH-PRESSURE STOPCOCK WITH ROTATING END AND PREMIUM HIGH-PRESSURE TUBING: Brand: ANGIOTOUCH® KIT

## (undated) DEVICE — CONTAINER,SPECIMEN,O.R.STRL,4.5OZ: Brand: MEDLINE

## (undated) DEVICE — SENSR O2 OXIMAX FNGR ADHS A/ 1P/U

## (undated) DEVICE — CATH LAB PACK: Brand: MEDLINE INDUSTRIES, INC.

## (undated) DEVICE — MIT PREP PRE/OP 5.5X7IN

## (undated) DEVICE — CANNULA,OXY,ADULT,SUPER SOFT,W/14'TUB,UC: Brand: MEDLINE INDUSTRIES, INC.

## (undated) DEVICE — CONTRST ISOVUE370 76PCT 50ML

## (undated) DEVICE — GLIDESHEATH SLENDER STAINLESS STEEL KIT: Brand: GLIDESHEATH SLENDER

## (undated) DEVICE — NC TREK CORONARY DILATATION CATHETER 3.0 MM X 12 MM / RAPID-EXCHANGE: Brand: NC TREK

## (undated) DEVICE — GW WWSS35145 WHOLEY WIRE V04: Brand: WHOLEY™

## (undated) DEVICE — MODEL BT2000 P/N 700287-012KIT CONTENTS: MANIFOLD WITH SALINE AND CONTRAST PORTS, SALINE TUBING WITH SPIKE AND HAND SYRINGE, TRANSDUCER: Brand: BT2000 AUTOMATED MANIFOLD KIT

## (undated) DEVICE — TR BAND RADIAL ARTERY COMPRESSION DEVICE: Brand: TR BAND

## (undated) DEVICE — TREK CORONARY DILATATION CATHETER 2.75 MM X 12 MM / RAPID-EXCHANGE: Brand: TREK

## (undated) DEVICE — GAUZE,SPONGE,4"X4",16PLY,STRL,LF,10/TRAY: Brand: MEDLINE

## (undated) DEVICE — DRSNG SURESITE WNDW 4X4.5

## (undated) DEVICE — CATH F6 ST JR 4 100CM: Brand: SUPERTORQUE

## (undated) DEVICE — CONTRST ISOVUE370 76PCT 100ML